# Patient Record
Sex: MALE | Race: WHITE
[De-identification: names, ages, dates, MRNs, and addresses within clinical notes are randomized per-mention and may not be internally consistent; named-entity substitution may affect disease eponyms.]

---

## 2017-01-03 NOTE — XMS REPORT
Continuity of Care Document

 Created on: 2014



HUNG REINOSO

External Reference #: [id number]

: 1955

Sex: Male



Demographics







 Address  [address] [place]  [address]

 

 Home Phone  [phone]

 

 Preferred Language  English

 

 Marital Status  Unknown

 

 Hinduism Affiliation  Unknown

 

 Race  Unknown

 

 Ethnic Group  Unknown





Author







 Author  [facility]

 

 Organization  [facility]

 

 Address  Unknown

 

 Phone  Unavailable







Support







 Name  Relationship  Address  Phone

 

 FREDRICK HEARN DO  Caregiver  [address] [place]

[place]  [address] [phone]

 

 ELANA YI MD  Caregiver  [facility]

 [address] [place]  [address] [phone]

 

 STACY REINOSO  Next Of Kin  [address] [place]  [address] [phone]







Insurance Providers







 Payer Name  Policy Number  Subscriber Name  Relationship

 

 [facility]  [id number]  Hung Reinoso  17 Self / Same As Patient







Advance Directives







 Directive  Response  Recorded Date/Time

 

 Advance Directives  Yes  14 7:25am

 

 Health Care Power of   No  14 7:25am

 

 Organ Donor  No  14 7:25am







Problems

No known problems or medical conditions.



Medications







 Medication  Dose  Route  Sig  Days/Qty  Instructions  Order Date  Discontinued 
Date  Status

 

 Lisinopril  10 Mg  PO  DAILY        12  Discontinued

 

 Sawpalmtofrtxt/Zinc Picolinate  1 Tab  PO  TWICE A DAY        12/04/12  05/10/
13  Discontinued

 

 Omega-3/Dha/Epa/Fish Oil  3,000 Mg  PO  THREE TIMES A DAY        12/04/12  05/
10/13  Discontinued

 

 Acetaminophen  1,000 Mg  PO  EVERY 6 HOURS PRN     PRN PAIN  01/15/13  05/10/
13  Discontinued

 

 Acetaminophen/Hydrocodone Bitart  5-500 Tab  PO  Q4HR PRN        13     
Active

 

 Fluticasone Propionate  2 Sprays  NSEACH  BEDTIME PRN     PRN ALLERGY SYMPTOMS
  13     Active

 

 Ondansetron Hcl  8 Mg  PO  EVERY 8HRS PRN        04/23/13  05/10/13  
Discontinued

 

 Sildenafil Citrate  100 Mg  PO  DAILY PRN     PRN IMPOTENCE  04/23/13  05/10/
13  Discontinued

 

 Acetaminophen/Hydrocodone Bitart  1 - 2 Each  PO  Q 4 - 6 HR PRN  30 Qty     05
/10/13  06/04/13  Discontinued

 

 Metoprolol Succinate  12.5 Mg  PO  BEDTIME        05/10/13  06/12/13  
Discontinued

 

 Pantoprazole Sod  40 Mg  PO  DAILY        05/10/13     Active

 

 Ondansetron Hcl  8 Mg  PO  EVERY 8HRS PRN     PRN NAUSEA AND VOMITING       Active

 

 Levofloxacin  500 Mg  PO  DAILY@11  7 Days     13  Discontinued

 

 Bethanechol Chl  50 Mg  PO  BEFORE MEALS AND AT BEDTIME  30 Days     13 
    Active

 

 Magnesium Oxide  250 Mg  PO           14     Active

 

 Tamsulosin Hcl  0.4 Mg  PO  TWICE A DAY        14     Active







Social History







 Social History Problem  Response  Recorded Date/Time

 

 Alcohol Use  Denies Use  2013 4:55pm

 

 Recreational Drug Use  No  2013 4:55pm

 

 Recent Foreign Travel  No  2013 4:55pm

 

 Recent Infectious Disease Exposure  No  2013 4:55pm

 

 Hospitalization with Isolation  Denies  2013 4:20pm

 

 Sexually Transmitted Disease  No  2013 4:55pm

 

 HIV/AIDS  No  2013 4:55pm







Hospital Discharge Instructions

No hospital discharge instructions.



Plan of Care

No plan of care.



Functional Status

No functional status results.



Allergies, Adverse Reactions, Alerts







 Allergen  Type  Severity  Reaction  Status  Last Updated

 

 Penicillins ([id number])  Allergy        Active  13







Immunizations







 Name  Given  Type

 

 Date of Influenza Vaccine  10/23/12  Historical

 

 Tetanus Booster (TDap)  More than 5yrs  Historical







Vital Signs

No known vital signs results.



Results

Laboratory Results







 Test Name  Result  Units  Flags  Reference  Collection Date/Time  Result Date/
Time  Comments

 

 White Blood Count  7.9  10^3/uL     4.3-11.0  2014 10:45am  2014 11
:06am   

 

 Red Blood Count  4.93  10^6/uL     4.35-5.85  2014 10:45am  2014 11
:06am   

 

 Hemoglobin  15.3  G/DL     13.3-17.7  2014 10:45am  2014 11:06am   

 

 Hematocrit  44  %     40-54  2014 10:45am  2014 11:06am   

 

 Mean Corpuscular Volume  90  FL     80-99  2014 10:45am  2014 11:
06am   

 

 Mean Corpuscular Hemoglobin  31  PG     25-34  2014 10:45am  2014 
11:06am   

 

 Mean Corpuscular Hemoglobin Concent  35  G/DL     32-36  2014 10:45am   11:06am   

 

 Red Cell Distribution Width  15.0  %  H  10.0-14.5  2014 10:45am  2014 11:06am   

 

 Platelet Count  270  10^3/uL     130-400  2014 10:45am  2014 11:
06am   

 

 Mean Platelet Volume  12.0  FL  H  7.4-10.4  2014 10:45am  2014 11:
06am   

 

 Neutrophils (%) (Auto)  68  %     42-75  2014 10:45am  2014 11:
06am   

 

 Lymphocytes (%) (Auto)  20  %     12-44  2014 10:45am  2014 11:
06am   

 

 Monocytes (%) (Auto)  11  %     0-12  2014 10:45am  2014 11:06am   

 

 Eosinophils (%) (Auto)  1  %     0-10  2014 10:45am  2014 11:06am 
  

 

 Basophils (%) (Auto)  0  %     0-10  2014 10:45am  2014 11:06am   

 

 Neutrophils # (Auto)  5.4  X 10^3     1.8-7.8  2014 10:45am  2014 
11:06am   

 

 Lymphocytes # (Auto)  1.6  X 10^3     1.0-4.0  2014 10:45am  2014 
11:06am   

 

 Monocytes # (Auto)  0.9  X 10^3     0.0-1.0  2014 10:45am  2014 11:
06am   

 

 Eosinophils # (Auto)  0.1  10^3/uL     0.0-0.3  2014 10:45am  2014 
11:06am   

 

 Basophils # (Auto)  0.0  10^3/uL     0.0-0.1  2014 10:45am  2014 11
:06am   

 

 Sodium Level  138  MMOL/L     135-145  2014 10:45am  2014 11:18am 
  

 

 Potassium Level  4.2  MMOL/L     3.6-5.0  2014 10:45am  2014 11:
18am   

 

 Chloride Level  108  MMOL/L  H    2014 10:45am  2014 11:18am
   

 

 Carbon Dioxide Level  20  MMOL/L  L  21-32  2014 10:45am  2014 11:
18am   

 

 Blood Urea Nitrogen  11  MG/DL     7-18  2014 10:45am  2014 11:
18am   

 

 Creatinine  1.07  MG/DL     0.60-1.30  2014 10:45am  2014 11:18am 
  

 

 BUN/Creatinine Ratio  10           2014 10:45am  2014 11:18am   

 

 Estimat Glomerular Filtration Rate  > 60           2014 10:45am  2014 11:18am                    GFR INTERPRETIVE DATA



         UNITS FOR ESTIMATED GFR (eGFR): mL/min/1.73 M2

         REFERENCE RANGE FOR ESTIMATED GFR (eGFR)

                                          eGFR

         NORMAL eGFR                       >60

         MODERATELY DECREASED eGFR          30-59

         SEVERLY DECREASED eGFR             15-29

         KIDNEY FAILURE                    <15 (OR DIALYSIS)

 

 Glucose Level  85  MG/DL       2014 10:45am  2014 11:18am   

 

 Calcium Level  9.1  MG/DL     8.5-10.1  2014 10:45am  2014 11:18am
   

 

 Magnesium Level  2.1  MG/DL     1.8-2.4  2014 10:45am  2014 11:
18am   

 

 Total Bilirubin  0.8  MG/DL     0.1-1.0  2014 10:45am  2014 11:
18am   

 

 Alkaline Phosphatase  91  U/L       2014 10:45am  2014 11:
18am   

 

 Aspartate Amino Transf (AST/SGOT)  15  U/L     5-34  2014 10:45am  2014 11:18am   

 

 Alanine Aminotransferase (ALT/SGPT)  16  U/L     0-55  2014 10:45am   11:18am   

 

 Total Protein  6.6  G/DL     6.4-8.2  2014 10:45am  2014 11:18am   

 

 Albumin  3.7  G/DL     3.2-4.5  2014 10:45am  2014 11:18am   







Procedures

No known history of procedures.



Encounters







 Encounter  Location  Date/Time

 

 Discharged Recurring  [facility]  14 10:18am

## 2017-03-27 ENCOUNTER — HOSPITAL ENCOUNTER (OUTPATIENT)
Dept: HOSPITAL 75 - ONC | Age: 62
LOS: 7 days | Discharge: HOME | End: 2017-04-03
Attending: INTERNAL MEDICINE
Payer: MEDICARE

## 2017-03-27 DIAGNOSIS — C20: Primary | ICD-10-CM

## 2017-03-27 DIAGNOSIS — N18.9: ICD-10-CM

## 2017-03-27 DIAGNOSIS — Z45.2: ICD-10-CM

## 2017-03-27 DIAGNOSIS — I12.9: ICD-10-CM

## 2017-03-27 DIAGNOSIS — Z79.899: ICD-10-CM

## 2017-03-27 PROCEDURE — 96523 IRRIG DRUG DELIVERY DEVICE: CPT

## 2017-05-02 LAB
ALBUMIN SERPL-MCNC: 3.9 G/DL (ref 3.2–4.5)
ALT SERPL-CCNC: 22 U/L (ref 0–55)
ANION GAP SERPL CALC-SCNC: 8 MMOL/L (ref 5–14)
AST SERPL-CCNC: 19 U/L (ref 5–34)
BASOPHILS # BLD AUTO: 0 10^3/UL (ref 0–0.1)
BASOPHILS NFR BLD AUTO: 0 % (ref 0–10)
BILIRUB SERPL-MCNC: 0.9 MG/DL (ref 0.1–1)
BUN SERPL-MCNC: 15 MG/DL (ref 7–18)
BUN/CREAT SERPL: 13
CALCIUM SERPL-MCNC: 9.1 MG/DL (ref 8.5–10.1)
CHLORIDE SERPL-SCNC: 106 MMOL/L (ref 98–107)
CO2 SERPL-SCNC: 23 MMOL/L (ref 21–32)
CREAT SERPL-MCNC: 1.13 MG/DL (ref 0.6–1.3)
EOSINOPHIL # BLD AUTO: 0.1 10^3/UL (ref 0–0.3)
EOSINOPHIL NFR BLD AUTO: 1 % (ref 0–10)
ERYTHROCYTE [DISTWIDTH] IN BLOOD BY AUTOMATED COUNT: 15.1 % (ref 10–14.5)
GFR SERPLBLD BASED ON 1.73 SQ M-ARVRAT: > 60 ML/MIN
GLUCOSE SERPL-MCNC: 187 MG/DL (ref 70–105)
LYMPHOCYTES # BLD AUTO: 1.3 X 10^3 (ref 1–4)
LYMPHOCYTES NFR BLD AUTO: 15 % (ref 12–44)
MCH RBC QN AUTO: 32 PG (ref 25–34)
MCHC RBC AUTO-ENTMCNC: 34 G/DL (ref 32–36)
MCV RBC AUTO: 93 FL (ref 80–99)
MONOCYTES # BLD AUTO: 0.7 X 10^3 (ref 0–1)
MONOCYTES NFR BLD AUTO: 8 % (ref 0–12)
NEUTROPHILS # BLD AUTO: 6.5 X 10^3 (ref 1.8–7.8)
NEUTROPHILS NFR BLD AUTO: 77 % (ref 42–75)
PLATELET # BLD: 216 10^3/UL (ref 130–400)
PMV BLD AUTO: 12.3 FL (ref 7.4–10.4)
POTASSIUM SERPL-SCNC: 4 MMOL/L (ref 3.6–5)
PROT SERPL-MCNC: 6.8 G/DL (ref 6.4–8.2)
RBC # BLD AUTO: 4.93 10^6/UL (ref 4.35–5.85)
SODIUM SERPL-SCNC: 137 MMOL/L (ref 135–145)
WBC # BLD AUTO: 8.5 10^3/UL (ref 4.3–11)

## 2017-06-29 ENCOUNTER — HOSPITAL ENCOUNTER (OUTPATIENT)
Dept: HOSPITAL 75 - RAD | Age: 62
End: 2017-06-29
Attending: FAMILY MEDICINE
Payer: MEDICARE

## 2017-06-29 DIAGNOSIS — E78.5: ICD-10-CM

## 2017-06-29 DIAGNOSIS — R73.9: ICD-10-CM

## 2017-06-29 DIAGNOSIS — R29.890: ICD-10-CM

## 2017-06-29 DIAGNOSIS — M85.89: Primary | ICD-10-CM

## 2017-06-29 LAB
ANION GAP SERPL CALC-SCNC: 12 MMOL/L (ref 5–14)
BUN SERPL-MCNC: 14 MG/DL (ref 7–18)
BUN/CREAT SERPL: 14
CALCIUM SERPL-MCNC: 9.2 MG/DL (ref 8.5–10.1)
CHLORIDE SERPL-SCNC: 107 MMOL/L (ref 98–107)
CHOLEST SERPL-MCNC: 165 MG/DL (ref ?–200)
CO2 SERPL-SCNC: 18 MMOL/L (ref 21–32)
CREAT SERPL-MCNC: 1.02 MG/DL (ref 0.6–1.3)
GFR SERPLBLD BASED ON 1.73 SQ M-ARVRAT: > 60 ML/MIN
GLUCOSE SERPL-MCNC: 94 MG/DL (ref 70–105)
LDLC SERPL DIRECT ASSAY-MCNC: 43 MG/DL (ref 1–129)
POTASSIUM SERPL-SCNC: 4.4 MMOL/L (ref 3.6–5)
SODIUM SERPL-SCNC: 137 MMOL/L (ref 135–145)
TRIGL SERPL-MCNC: 456 MG/DL (ref ?–150)
VLDLC SERPL CALC-MCNC: 91 MG/DL (ref 5–40)

## 2017-06-29 PROCEDURE — 80061 LIPID PANEL: CPT

## 2017-06-29 PROCEDURE — 80048 BASIC METABOLIC PNL TOTAL CA: CPT

## 2017-06-29 PROCEDURE — 77080 DXA BONE DENSITY AXIAL: CPT

## 2017-06-29 PROCEDURE — 83036 HEMOGLOBIN GLYCOSYLATED A1C: CPT

## 2017-06-29 PROCEDURE — 36415 COLL VENOUS BLD VENIPUNCTURE: CPT

## 2017-06-29 NOTE — DIAGNOSTIC IMAGING REPORT
Examination: DEXA scan.



Indication: Osteopenia.



Technique: Bone mineral density estimated based on dual energy

radiography over the lumbar spine and femoral necks, was

performed.



Findings: The lumbar spine T-score is -2.1.



T-score in the left femoral neck is -1.4 and on the right side is

-0.9.



Impression: Osteopenia.

 



Dictated by: 



  Dictated on workstation # [id number]

## 2017-07-25 ENCOUNTER — HOSPITAL ENCOUNTER (OUTPATIENT)
Dept: HOSPITAL 75 - ONC | Age: 62
LOS: 6 days | Discharge: HOME | End: 2017-07-31
Attending: INTERNAL MEDICINE
Payer: MEDICARE

## 2017-07-25 DIAGNOSIS — Z45.2: ICD-10-CM

## 2017-07-25 DIAGNOSIS — C20: Primary | ICD-10-CM

## 2017-07-25 DIAGNOSIS — I12.9: ICD-10-CM

## 2017-07-25 DIAGNOSIS — Z79.899: ICD-10-CM

## 2017-07-25 DIAGNOSIS — N18.9: ICD-10-CM

## 2017-07-25 PROCEDURE — 36591 DRAW BLOOD OFF VENOUS DEVICE: CPT

## 2017-07-25 PROCEDURE — 96372 THER/PROPH/DIAG INJ SC/IM: CPT

## 2017-07-25 PROCEDURE — 99213 OFFICE O/P EST LOW 20 MIN: CPT

## 2017-07-25 PROCEDURE — 80053 COMPREHEN METABOLIC PANEL: CPT

## 2017-07-25 PROCEDURE — 85025 COMPLETE CBC W/AUTO DIFF WBC: CPT

## 2017-07-25 PROCEDURE — 96523 IRRIG DRUG DELIVERY DEVICE: CPT

## 2017-07-25 PROCEDURE — 82378 CARCINOEMBRYONIC ANTIGEN: CPT

## 2017-09-12 ENCOUNTER — HOSPITAL ENCOUNTER (OUTPATIENT)
Dept: HOSPITAL 75 - ONC | Age: 62
LOS: 18 days | Discharge: HOME | End: 2017-09-30
Attending: INTERNAL MEDICINE
Payer: MEDICARE

## 2017-09-12 DIAGNOSIS — N18.9: ICD-10-CM

## 2017-09-12 DIAGNOSIS — C20: Primary | ICD-10-CM

## 2017-09-12 DIAGNOSIS — I12.9: ICD-10-CM

## 2017-09-12 DIAGNOSIS — Z45.2: ICD-10-CM

## 2017-09-12 DIAGNOSIS — Z79.899: ICD-10-CM

## 2017-09-12 PROCEDURE — 96523 IRRIG DRUG DELIVERY DEVICE: CPT

## 2017-10-17 ENCOUNTER — HOSPITAL ENCOUNTER (OUTPATIENT)
Dept: HOSPITAL 75 - ONC | Age: 62
LOS: 36 days | Discharge: HOME | End: 2017-11-22
Attending: INTERNAL MEDICINE
Payer: MEDICARE

## 2017-10-17 DIAGNOSIS — N18.9: ICD-10-CM

## 2017-10-17 DIAGNOSIS — C20: Primary | ICD-10-CM

## 2017-10-17 DIAGNOSIS — Z79.899: ICD-10-CM

## 2017-10-17 DIAGNOSIS — Z45.2: ICD-10-CM

## 2017-10-17 DIAGNOSIS — I12.9: ICD-10-CM

## 2017-10-17 PROCEDURE — 96523 IRRIG DRUG DELIVERY DEVICE: CPT

## 2017-11-22 ENCOUNTER — HOSPITAL ENCOUNTER (OUTPATIENT)
Dept: HOSPITAL 75 - LAB | Age: 62
End: 2017-11-22
Attending: INTERNAL MEDICINE
Payer: MEDICARE

## 2017-11-22 DIAGNOSIS — E78.5: Primary | ICD-10-CM

## 2017-11-22 LAB
CHOLEST SERPL-MCNC: 146 MG/DL (ref ?–200)
LDLC SERPL DIRECT ASSAY-MCNC: 60 MG/DL (ref 1–129)
TRIGL SERPL-MCNC: 256 MG/DL (ref ?–150)
VLDLC SERPL CALC-MCNC: 51 MG/DL (ref 5–40)

## 2017-11-22 PROCEDURE — 80061 LIPID PANEL: CPT

## 2017-11-22 PROCEDURE — 36415 COLL VENOUS BLD VENIPUNCTURE: CPT

## 2018-01-04 ENCOUNTER — HOSPITAL ENCOUNTER (OUTPATIENT)
Dept: HOSPITAL 75 - LAB | Age: 63
End: 2018-01-04
Attending: UROLOGY
Payer: MEDICARE

## 2018-01-04 DIAGNOSIS — Z12.5: Primary | ICD-10-CM

## 2018-01-04 PROCEDURE — 36415 COLL VENOUS BLD VENIPUNCTURE: CPT

## 2018-01-04 PROCEDURE — 84153 ASSAY OF PSA TOTAL: CPT

## 2018-01-25 ENCOUNTER — HOSPITAL ENCOUNTER (OUTPATIENT)
Dept: HOSPITAL 75 - SDC | Age: 63
End: 2018-01-25
Attending: FAMILY MEDICINE
Payer: MEDICARE

## 2018-01-25 DIAGNOSIS — M81.0: Primary | ICD-10-CM

## 2018-02-20 ENCOUNTER — HOSPITAL ENCOUNTER (OUTPATIENT)
Dept: HOSPITAL 75 - ONC | Age: 63
Discharge: HOME | End: 2018-02-20
Attending: INTERNAL MEDICINE
Payer: MEDICARE

## 2018-02-20 DIAGNOSIS — C20: Primary | ICD-10-CM

## 2018-02-20 DIAGNOSIS — Z45.2: ICD-10-CM

## 2018-02-20 DIAGNOSIS — I12.9: ICD-10-CM

## 2018-02-20 DIAGNOSIS — Z79.899: ICD-10-CM

## 2018-02-20 DIAGNOSIS — N18.9: ICD-10-CM

## 2018-02-20 PROCEDURE — 96523 IRRIG DRUG DELIVERY DEVICE: CPT

## 2018-02-20 PROCEDURE — 36591 DRAW BLOOD OFF VENOUS DEVICE: CPT

## 2018-06-05 ENCOUNTER — HOSPITAL ENCOUNTER (OUTPATIENT)
Dept: HOSPITAL 75 - ONC | Age: 63
LOS: 35 days | Discharge: HOME | End: 2018-07-10
Attending: INTERNAL MEDICINE
Payer: MEDICARE

## 2018-06-05 DIAGNOSIS — N18.9: ICD-10-CM

## 2018-06-05 DIAGNOSIS — C20: Primary | ICD-10-CM

## 2018-06-05 DIAGNOSIS — Z79.899: ICD-10-CM

## 2018-06-05 DIAGNOSIS — I12.9: ICD-10-CM

## 2018-06-05 LAB
ALBUMIN SERPL-MCNC: 3.7 GM/DL (ref 3.2–4.5)
ALP SERPL-CCNC: 74 U/L (ref 40–136)
ALT SERPL-CCNC: 16 U/L (ref 0–55)
BASOPHILS # BLD AUTO: 0 10^3/UL (ref 0–0.1)
BASOPHILS NFR BLD AUTO: 1 % (ref 0–10)
BILIRUB SERPL-MCNC: 0.8 MG/DL (ref 0.1–1)
BUN/CREAT SERPL: 18
CALCIUM SERPL-MCNC: 8.9 MG/DL (ref 8.5–10.1)
CHLORIDE SERPL-SCNC: 109 MMOL/L (ref 98–107)
CO2 SERPL-SCNC: 18 MMOL/L (ref 21–32)
CREAT SERPL-MCNC: 0.98 MG/DL (ref 0.6–1.3)
EOSINOPHIL # BLD AUTO: 0.2 10^3/UL (ref 0–0.3)
EOSINOPHIL NFR BLD AUTO: 2 % (ref 0–10)
ERYTHROCYTE [DISTWIDTH] IN BLOOD BY AUTOMATED COUNT: 14.3 % (ref 10–14.5)
GFR SERPLBLD BASED ON 1.73 SQ M-ARVRAT: > 60 ML/MIN
GLUCOSE SERPL-MCNC: 121 MG/DL (ref 70–105)
HCT VFR BLD CALC: 46 % (ref 40–54)
HGB BLD-MCNC: 16.1 G/DL (ref 13.3–17.7)
LYMPHOCYTES # BLD AUTO: 1.5 X 10^3 (ref 1–4)
LYMPHOCYTES NFR BLD AUTO: 17 % (ref 12–44)
MANUAL DIFFERENTIAL PERFORMED BLD QL: NO
MCH RBC QN AUTO: 32 PG (ref 25–34)
MCHC RBC AUTO-ENTMCNC: 35 G/DL (ref 32–36)
MCV RBC AUTO: 93 FL (ref 80–99)
MONOCYTES # BLD AUTO: 1 X 10^3 (ref 0–1)
MONOCYTES NFR BLD AUTO: 11 % (ref 0–12)
NEUTROPHILS # BLD AUTO: 6.2 X 10^3 (ref 1.8–7.8)
NEUTROPHILS NFR BLD AUTO: 70 % (ref 42–75)
PLATELET # BLD: 196 10^3/UL (ref 130–400)
PMV BLD AUTO: 13.2 FL (ref 7.4–10.4)
POTASSIUM SERPL-SCNC: 4.5 MMOL/L (ref 3.6–5)
PROT SERPL-MCNC: 6.5 GM/DL (ref 6.4–8.2)
RBC # BLD AUTO: 4.98 10^6/UL (ref 4.35–5.85)
SODIUM SERPL-SCNC: 137 MMOL/L (ref 135–145)
WBC # BLD AUTO: 8.8 10^3/UL (ref 4.3–11)

## 2018-06-05 PROCEDURE — 80053 COMPREHEN METABOLIC PANEL: CPT

## 2018-06-05 PROCEDURE — 36591 DRAW BLOOD OFF VENOUS DEVICE: CPT

## 2018-06-05 PROCEDURE — 82378 CARCINOEMBRYONIC ANTIGEN: CPT

## 2018-06-05 PROCEDURE — 85025 COMPLETE CBC W/AUTO DIFF WBC: CPT

## 2018-07-10 ENCOUNTER — HOSPITAL ENCOUNTER (OUTPATIENT)
Dept: HOSPITAL 75 - ONC | Age: 63
LOS: 21 days | Discharge: HOME | End: 2018-07-31
Attending: INTERNAL MEDICINE
Payer: MEDICARE

## 2018-07-10 DIAGNOSIS — M85.80: ICD-10-CM

## 2018-07-10 DIAGNOSIS — Z79.82: ICD-10-CM

## 2018-07-10 DIAGNOSIS — I12.9: ICD-10-CM

## 2018-07-10 DIAGNOSIS — N18.9: ICD-10-CM

## 2018-07-10 DIAGNOSIS — C20: Primary | ICD-10-CM

## 2018-07-10 DIAGNOSIS — Z79.899: ICD-10-CM

## 2018-07-10 LAB
ALBUMIN SERPL-MCNC: 4 GM/DL (ref 3.2–4.5)
ALP SERPL-CCNC: 82 U/L (ref 40–136)
ALT SERPL-CCNC: 21 U/L (ref 0–55)
BASOPHILS # BLD AUTO: 0 10^3/UL (ref 0–0.1)
BASOPHILS NFR BLD AUTO: 0 % (ref 0–10)
BILIRUB SERPL-MCNC: 0.7 MG/DL (ref 0.1–1)
BUN/CREAT SERPL: 15
CALCIUM SERPL-MCNC: 9.8 MG/DL (ref 8.5–10.1)
CHLORIDE SERPL-SCNC: 110 MMOL/L (ref 98–107)
CO2 SERPL-SCNC: 19 MMOL/L (ref 21–32)
CREAT SERPL-MCNC: 1.4 MG/DL (ref 0.6–1.3)
EOSINOPHIL # BLD AUTO: 0.1 10^3/UL (ref 0–0.3)
EOSINOPHIL NFR BLD AUTO: 1 % (ref 0–10)
ERYTHROCYTE [DISTWIDTH] IN BLOOD BY AUTOMATED COUNT: 14.4 % (ref 10–14.5)
GFR SERPLBLD BASED ON 1.73 SQ M-ARVRAT: 51 ML/MIN
GLUCOSE SERPL-MCNC: 111 MG/DL (ref 70–105)
HCT VFR BLD CALC: 48 % (ref 40–54)
HGB BLD-MCNC: 17.1 G/DL (ref 13.3–17.7)
LYMPHOCYTES # BLD AUTO: 1.9 X 10^3 (ref 1–4)
LYMPHOCYTES NFR BLD AUTO: 18 % (ref 12–44)
MANUAL DIFFERENTIAL PERFORMED BLD QL: NO
MCH RBC QN AUTO: 33 PG (ref 25–34)
MCHC RBC AUTO-ENTMCNC: 36 G/DL (ref 32–36)
MCV RBC AUTO: 93 FL (ref 80–99)
MONOCYTES # BLD AUTO: 1.5 X 10^3 (ref 0–1)
MONOCYTES NFR BLD AUTO: 15 % (ref 0–12)
NEUTROPHILS # BLD AUTO: 6.6 X 10^3 (ref 1.8–7.8)
NEUTROPHILS NFR BLD AUTO: 65 % (ref 42–75)
PLATELET # BLD: 206 10^3/UL (ref 130–400)
PMV BLD AUTO: 12.7 FL (ref 7.4–10.4)
POTASSIUM SERPL-SCNC: 4.9 MMOL/L (ref 3.6–5)
PROT SERPL-MCNC: 6.9 GM/DL (ref 6.4–8.2)
RBC # BLD AUTO: 5.15 10^6/UL (ref 4.35–5.85)
SODIUM SERPL-SCNC: 139 MMOL/L (ref 135–145)
WBC # BLD AUTO: 10 10^3/UL (ref 4.3–11)

## 2018-07-10 PROCEDURE — 36591 DRAW BLOOD OFF VENOUS DEVICE: CPT

## 2018-07-20 ENCOUNTER — HOSPITAL ENCOUNTER (OUTPATIENT)
Dept: HOSPITAL 75 - PREOP | Age: 63
End: 2018-07-20
Attending: SURGERY
Payer: MEDICARE

## 2018-07-20 VITALS — HEIGHT: 67 IN | WEIGHT: 173.13 LBS | BODY MASS INDEX: 27.17 KG/M2

## 2018-07-20 DIAGNOSIS — Z01.818: Primary | ICD-10-CM

## 2018-07-20 DIAGNOSIS — C20: ICD-10-CM

## 2018-08-17 ENCOUNTER — HOSPITAL ENCOUNTER (OUTPATIENT)
Dept: HOSPITAL 75 - SDC | Age: 63
Discharge: HOME | End: 2018-08-17
Attending: SURGERY
Payer: MEDICARE

## 2018-08-17 VITALS — BODY MASS INDEX: 32.21 KG/M2 | HEIGHT: 67 IN | WEIGHT: 205.25 LBS

## 2018-08-17 VITALS — SYSTOLIC BLOOD PRESSURE: 140 MMHG | DIASTOLIC BLOOD PRESSURE: 80 MMHG

## 2018-08-17 VITALS — DIASTOLIC BLOOD PRESSURE: 74 MMHG | SYSTOLIC BLOOD PRESSURE: 116 MMHG

## 2018-08-17 VITALS — DIASTOLIC BLOOD PRESSURE: 68 MMHG | SYSTOLIC BLOOD PRESSURE: 116 MMHG

## 2018-08-17 VITALS — SYSTOLIC BLOOD PRESSURE: 116 MMHG | DIASTOLIC BLOOD PRESSURE: 68 MMHG

## 2018-08-17 DIAGNOSIS — Z85.048: Primary | ICD-10-CM

## 2018-08-17 DIAGNOSIS — Z92.21: ICD-10-CM

## 2018-08-17 DIAGNOSIS — Z87.891: ICD-10-CM

## 2018-08-17 DIAGNOSIS — Z92.3: ICD-10-CM

## 2018-08-17 DIAGNOSIS — I10: ICD-10-CM

## 2018-08-17 DIAGNOSIS — Z86.73: ICD-10-CM

## 2018-08-17 DIAGNOSIS — Z11.2: ICD-10-CM

## 2018-08-17 DIAGNOSIS — M19.91: ICD-10-CM

## 2018-08-17 PROCEDURE — 87081 CULTURE SCREEN ONLY: CPT

## 2018-08-17 NOTE — OPERATIVE REPORT
Operative Report


Date of Procedure/Surgery


Aug 17, 2018


Surgeon (s)


ELIZA BROWN MD


Assistant (s):  N/A





Post-Operative Diagnosis





Same





Procedure Performed





Removal of Nphtar-x-Jofr





Description of Procedure


Anesthesia Type:  MAC


Estimated blood loss (mL):  Minimal


Specimen(s) collected/removed


None


Description of the Procedure


Indication for the procedure: This gentleman completed postoperative adjuvant 

therapy, following resection of rectal carcinoma, several years ago.  After an 

adequate discussion with his oncologist, it was felt reasonable to remove his 

Dugvmj-d-Bmbr.  Informed consent was obtained after reviewing the procedure in 

detail.





Description of procedure: He was placed supine on the operating table and our 

CRNA administered sedation, monitoring his vital signs.  Intravenous 

antibiotics were administered as prophylaxis against wound infection.  Right 

infraclavicular fossa was prepared and draped in the usual sterile manner.  

Local anesthesia was achieved using 0.5 percent Marcaine with epinephrine.  A 

secondary incision was made along the previous scar and Tlhixy-o-Yoiw removed 

without risking air embolism.  The incision was then closed using 3-0 Vicryl 

for the dermal layer and 4-0 Vicryl for skin, in a subcuticular fashion.





Steri-Strips and a sterile dressing were applied.





He tolerated the procedure well and was taken to the recovery room in a stable 

condition.


Findings of the Procedure


See op report





Allergies and Home Medications


Allergies


Coded Allergies:  


     Penicillins (Unverified  Allergy, Unknown, 7/20/18)





Home Medications


Aspirin 325 Mg Tablet, 325 MG PO DAILY, (Reported)


Hydrocodone Bit/Acetaminophen 1 Tab Tab, 1-2 TAB PO 4-6HR PRN for PAIN


   Prescribed by: ELIZA BROWN on 8/17/18 1042


Hydrocodone/Acetaminophen 1 Each Tablet, 1 EACH PO Q4-6HR PRN for PAIN, (

Reported)


Multivitamin 1 Each Tablet, 1 EACH PO DAILY, (Reported)


Omega 3 Polyunsat Fatty Acids 1,000 Mg Cap, 1,000 MG PO TID, (Reported)





Patient Home Medication List


Home Medication List Reviewed:  Yes











ELIZA BROWN MD Aug 17, 2018 11:25 am

## 2018-08-17 NOTE — XMS REPORT
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                        
          

 

 10/27/2015            RAFY RAMAN ARNP            Ot            154.1    
                              

 

 10/27/2015            RAFY RAMAN ARNP            Ot            403.90   
                               

 

 10/27/2015            RAFY RAMAN ARNP            Ot            585.9    
                              

 

 10/27/2015            RAFY RAMAN ARNP            Ot            716.90   
                               

 

 10/27/2015            RAFY RAMAN ARNP            Ot            746.4    
                              

 

 10/27/2015            RAFY RAMAN ARNP            Ot            V58.69   
                               

 

 10/27/2015            ORENDER DO, NAT S            Ot            285.9 
                                 

 

 10/27/2015            ORENDER DO, NAT S            Ot            586   
                               

 

 10/27/2015            SCHOENFELD DO, ROGER H            Ot            788.20  
                                

 

 10/27/2015            STEPHANIE HERNANDEZ, ELIZA REED            Ot            V72.84    
                              

 

 10/27/2015            ORENDER DO, NAT S            Ot            272.4 
                                 

 

 10/27/2015            ORENDER DO, NAT S            Ot            285.9 
                                 

 

 10/27/2015            ORENDER DO, NAT S            Ot            780.79
                                  

 

 10/27/2015            KD HERNANDEZ, ELANA DUARTE            Ot            154.1          
                        

 

 10/27/2015            BROWN DO, GREGG            Ot            266.2         
                         

 

 10/27/2015            BROWN DO, GREGG            Ot            272.4         
                         

 

 10/27/2015            BROWN DO, GREGG            Ot            780.79        
                          

 

 10/27/2015            KD HERNANDEZ, ELANA DUARTE            Ot            154.1          
                        

 

 10/27/2015            KD HERNANDEZ, ELANA DUARTE            Ot            403.90         
                         

 

 10/27/2015            KD HERNANDEZ, ELANA DUARTE            Ot            585.9          
                        

 

 10/27/2015            KD HERNANDEZ, ELANA DUARTE            Ot            716.90         
                         

 

 10/27/2015            KD HERNANDEZ, ELANA DUARTE            Ot            V58.69         
                         

 

 10/27/2015            KD HERNANDEZ, ELANA DUARTE            Ot            V58.81         
                         

 

 10/29/2015            KD HERNANDEZ, ELANA DUARTE            Ot            C20            
                      

 

 2015            KD HERNANDEZ, ELANA DUARTE            Ot            154.1          
                        

 

 2015            KD HERNANDEZ, ELANA DUARTE            Ot            403.90         
                         

 

 2015            KD HERNANDEZ, ELANA DUARTE            Ot            585.9          
                        

 

 2015            KD HERNANDEZ, ELANA DUARTE            Ot            716.90         
                         

 

 2015            KD HERNANDEZ, ELANA DUARTE            Ot            V58.69         
                         

 

 2015            KD HERNANDEZ, ELANA DUARTE            Ot            V58.81         
                         

 

 2015            KD HERNANDEZ, ELANA DUARTE            Ot            C20            
                      

 

 2015            KD HERNANDEZ, ELANA DUARTE            Ot            154.1          
                        

 

 2015            KD HERNANDEZ, ELANA DUARTE            Ot            403.90         
                         

 

 2015            KD HERNANDEZ, ELANA DUARTE            Ot            585.9          
                        

 

 2015            KD HERNANDEZ, ELANA DUARTE            Ot            716.90         
                         

 

 2015            KD HERNANDEZ, ELANA DUARTE            Ot            V58.69         
                         

 

 2015            KD HERNANDEZ, ELANA DUARTE            Ot            V58.81         
                         

 

 2015            KD HERNANDEZ, ELANA DUARTE            Ot            C20            
                      

 

 2015            SCHOENFELD DO, ROGER H            Ot            Z12.5   
                               

 

 2015            KD HERNANDEZ, ELANA DUARTE            Ot            C20            
                      

 

 2015            DK HERNANDEZ, ELANA DUARTE            Ot            I12.9          
                        

 

 2015            KD HERNANDEZ, ELANA DUARTE            Ot            N18.9          
                        

 

 2015            KD HERNANDEZ, ELANA DUARTE            Ot            Z79.899        
                          

 

 2015            KD HERNANDEZ, ELANA DUARTE            Ot            C20            
                      

 

 2016            KD HERNANDEZ, ELANA DUARTE            Ot            C20            
                      

 

 2016            KD HERNANDEZ, ELANA DUARTE            Ot            I12.9          
                        

 

 2016            KD HERNANDEZ, ELANA DUARTE            Ot            N18.9          
                        

 

 2016            KD HERNANDEZ, ELANA DUARTE            Ot            Z79.899        
                          

 

 2016            KD HERNANDEZ, ELANA DUARTE            Ot            C20            
                      

 

 2016            KD HERNANDEZ, ELANA DUARTE            Ot            I12.9          
                        

 

 2016            KD HERNANDEZ, ELANA DUARTE            Ot            N18.9          
                        

 

 2016            KD HERNANDEZ, ELANA DUARTE            Ot            Z79.899        
                          

 

 2016            KD HERNANDEZ, ELANA DUARTE            Ot            C20            
MALIGNANT NEOPLASM OF RECTUM                     

 

 2016            KD HERNANDEZ, ELANA DUARTE            Ot            I12.9          
  HYPERTENSIVE CHRONIC KIDNEY DISEASE W ST                     

 

 2016            KD HERNANDEZ, ELANA DUARTE            Ot            N18.9          
  CHRONIC KIDNEY DISEASE, UNSPECIFIED                     

 

 2016            KD HERNANDEZ, ELANA DUARTE            Ot            Z45.2          
  ENCOUNTER FOR ADJUSTMENT AND MANAGEMENT                      

 

 2016            KD HERNANDEZ, ELANA DUARTE            Ot            Z79.899        
    OTHER LONG TERM (CURRENT) DRUG THERAPY                     

 

 2016            NAT INIGUEZ DO            Ot            Z53.9 
                                 

 

 02/10/2016            KD HERNANDEZ, ELANA DUARTE            Ot            C20            
                      

 

 02/10/2016            KD HERNANDEZ, ELANA DUARTE            Ot            I12.9          
                        

 

 02/10/2016            KD HERNANDEZ, ELANA DUARTE            Ot            N18.9          
                        

 

 02/10/2016            KD HERNANDEZ, ELANA DUARTE            Ot            Z79.899        
                          

 

 2016            KD HERNANDEZ, ELANA DUARTE            Ot            C20            
                      

 

 2016            KD HERNANDEZ, ELANA DUARTE            Ot            I12.9          
                        

 

 2016            KD HERNANDEZ, ELANA DUARTE            Ot            N18.9          
                        

 

 2016            KD HERNANDEZ, ELANA DUARTE            Ot            Z45.2          
                        

 

 2016            KD HERNANDEZ, ELANA DUARTE            Ot            Z79.899        
                          

 

 2016            KD HERNANDEZ, ELANA DUARTE            Ot            C20            
MALIGNANT NEOPLASM OF RECTUM                     

 

 2016            KD HERNANDEZ, ELANA DUARTE            Ot            I12.9          
  HYPERTENSIVE CHRONIC KIDNEY DISEASE W ST                     

 

 2016            KD HERNANDEZ, ELANA DUARTE            Ot            N18.9          
  CHRONIC KIDNEY DISEASE, UNSPECIFIED                     

 

 2016            KD HERNANDEZ, ELANA DUARTE            Ot            Z45.2          
  ENCOUNTER FOR ADJUSTMENT AND MANAGEMENT                      

 

 2016            ELANA YI MD, Ot            Z79.899        
    OTHER LONG TERM (CURRENT) DRUG THERAPY                     

 

 2016            ELANA YI MD, Ot            C20            
MALIGNANT NEOPLASM OF RECTUM                     

 

 2016            ELANA YI MD            Ot            I12.9          
  HYPERTENSIVE CHRONIC KIDNEY DISEASE W ST                     

 

 2016            ELANA YI MD, Ot            N18.9          
  CHRONIC KIDNEY DISEASE, UNSPECIFIED                     

 

 2016            ELANA YI MD            Ot            Z45.2          
  ENCOUNTER FOR ADJUSTMENT AND MANAGEMENT                      

 

 2016            ELANA YI MD, Ot            Z79.899        
    OTHER LONG TERM (CURRENT) DRUG THERAPY                     

 

 2016            ORENDER DO, NAT S            Ot            E78.2 
           MIXED HYPERLIPIDEMIA                     

 

 2016            ORENDER DO, NAT S            Ot            R33.9 
           RETENTION OF URINE, UNSPECIFIED                     

 

 2016            ORENDER DO, NAT S            Ot            E78.2 
           MIXED HYPERLIPIDEMIA                     

 

 2016            ORENDER DO, NAT S            Ot            R33.9 
           RETENTION OF URINE, UNSPECIFIED                     

 

 2016            ORENDER DO, NAT S            Ot            E78.2 
           MIXED HYPERLIPIDEMIA                     

 

 2016            ORENDER DO, NAT S            Ot            R33.9 
           RETENTION OF URINE, UNSPECIFIED                     

 

 2016            ORENDER DO, NAT S            Ot            E78.2 
           MIXED HYPERLIPIDEMIA                     

 

 2016            ORENDER DO, NAT S            Ot            R33.9 
           RETENTION OF URINE, UNSPECIFIED                     

 

 2016            ORENDER DO, NAT S            Ot            E78.2 
           MIXED HYPERLIPIDEMIA                     

 

 2016            ORENDER DO, NAT S            Ot            R33.9 
           RETENTION OF URINE, UNSPECIFIED                     

 

 2016            ELANA YI MD            Ot            C20            
MALIGNANT NEOPLASM OF RECTUM                     

 

 2016            ELANA YI MD            Ot            I12.9          
  HYPERTENSIVE CHRONIC KIDNEY DISEASE W ST                     

 

 2016            ELANA YI MD            Ot            N18.9          
  CHRONIC KIDNEY DISEASE, UNSPECIFIED                     

 

 2016            ELANA YI MD            Ot            Z45.2          
  ENCOUNTER FOR ADJUSTMENT AND MANAGEMENT                      

 

 2016            ELANA YI MD, Ot            Z79.899        
    OTHER LONG TERM (CURRENT) DRUG THERAPY                     

 

 2016            ELANA YI MD            Ot            C20            
MALIGNANT NEOPLASM OF RECTUM                     

 

 2016            ELANA YI MD            Ot            I12.9          
  HYPERTENSIVE CHRONIC KIDNEY DISEASE W ST                     

 

 2016            ELANA YI MD            Ot            N18.9          
  CHRONIC KIDNEY DISEASE, UNSPECIFIED                     

 

 2016            ELANA YI MD            Ot            Z45.2          
  ENCOUNTER FOR ADJUSTMENT AND MANAGEMENT                      

 

 2016            ELANA YI MD            Ot            Z79.899        
    OTHER LONG TERM (CURRENT) DRUG THERAPY                     

 

 10/04/2016            ELANA YI MD            Ot            C20            
MALIGNANT NEOPLASM OF RECTUM                     

 

 10/04/2016            ELANA YI MD            Ot            I12.9          
  HYPERTENSIVE CHRONIC KIDNEY DISEASE W ST                     

 

 10/04/2016            ELANA YI MD            Ot            N18.9          
  CHRONIC KIDNEY DISEASE, UNSPECIFIED                     

 

 10/04/2016            ELANA YI MD            Ot            Z45.2          
  ENCOUNTER FOR ADJUSTMENT AND MANAGEMENT                      

 

 10/04/2016            ELANA YI MD            Ot            Z79.899        
    OTHER LONG TERM (CURRENT) DRUG THERAPY                     

 

 10/05/2016            ELANA YI MD            Ot            C20            
MALIGNANT NEOPLASM OF RECTUM                     

 

 10/05/2016            ELANA YI MD            Ot            I12.9          
  HYPERTENSIVE CHRONIC KIDNEY DISEASE W ST                     

 

 10/05/2016            ELANA YI MD            Ot            N18.9          
  CHRONIC KIDNEY DISEASE, UNSPECIFIED                     

 

 10/05/2016            EALNA YI MD            Ot            Z45.2          
  ENCOUNTER FOR ADJUSTMENT AND MANAGEMENT                      

 

 10/05/2016            ELANA YI MD            Ot            Z79.899        
    OTHER LONG TERM (CURRENT) DRUG THERAPY                     

 

 2016                         Ot            401.9            HYPERTENSION 
NOS                     

 

 2016                         Ot            424.1            AORTIC VALVE 
DISORDER                     

 

 2016                         Ot            600.00            HYPERTROPHY 
(BENIGN) OF PROSTATE W/O URI                     

 

 2016                         Ot            780.79            OTH MALAISE 
FATIGUE                     

 

 2016                         Ot            V70.0            ROUTINE 
MEDICAL EXAM                     

 

 2016                         Ot            401.9            HYPERTENSION 
NOS                     

 

 2016                         Ot            V70.0            ROUTINE 
MEDICAL EXAM                     

 

 2016                         Ot            V72.62            LAB EXAM 
ORDERED AS PART OF A ROUTINE GE                     

 

 2016                         Ot            V72.84            EXAM PRE-
OPERATIVE NOS                     

 

 2016                         Ot            600.00            HYPERTROPHY 
(BENIGN) OF PROSTATE W/O URI                     

 

 2016                         Ot            V76.44            SCREEN MAL 
NEOP-PROSTATE                     

 

 2016                         Ot            V72.84            EXAM PRE-
OPERATIVE NOS                     

 

 2016                         Ot            154.1            MALIGNANT 
NEOPL RECTUM                     

 

 2016                         Ot            396.3            MITRAL/
AORTIC ISABEL INSUFF                     

 

 2016                         Ot            397.0            TRICUSPID 
VALVE DISEASE                     

 

 2016                         Ot            429.3            CARDIOMEGALY
                     

 

 2016                         Ot            154.1            MALIGNANT 
NEOPL RECTUM                     

 

 2016                         Ot            V72.81            EXAM-PRE-
OPERATIVE CARDIOVASCULAR                     

 

 2016                         Ot            V74.8            SCREEN-
BACTERIAL DIS NEC                     

 

 2016                         Ot            V72.84            EXAM PRE-
OPERATIVE NOS                     

 

 2016                         Ot            154.1            MALIGNANT 
NEOPL RECTUM                     

 

 2016                         Ot            V72.63            PRE-
PROCEDURAL LABORATORY EXAMINATION                     

 

 2016                         Ot            V74.8            SCREEN-
BACTERIAL DIS NEC                     

 

 2016            RAFY RAMAN ARNP            Ot            154.1    
        MALIGNANT NEOPL RECTUM                     

 

 2016            RAFY RAMAN ARNP            Ot            403.90   
         HYPTNSV CHR KID DIS, UNSPEC, W CHR KD ST                     

 

 2016            RAFY RAMAN S ARNP            Ot            585.9    
        CHRONIC KIDNEY DISEASE, UNSPECIFIED                     

 

 2016            RAFY RAMAN S ARNP            Ot            716.90   
         ARTHROPATHY NOS-UNSPEC                     

 

 2016            RAFY RAMAN S ARNP            Ot            746.4    
        BOB AORTA VALV INSUFFIC                     

 

 2016            RAFY RAMAN ARNP            Ot            V58.69   
         OTH MED,LT,CURRENT USE                     

 

 2016            ORENDER DO, NAT S            Ot            285.9 
           ANEMIA NOS                     

 

 2016            ORENDER DO, NAT S            Ot            586   
         RENAL FAILURE NOS                     

 

 2016            SCHOENFELD DO, ROGER H            Ot            788.20  
          RETENTION OF URINE NOS                     

 

 2016            STEPHANIE HERNANDEZ, ELIZA REED            Ot            V72.84    
        EXAM PRE-OPERATIVE NOS                     

 

 2016            ORENDER , NAT S            Ot            272.4 
           HYPERLIPIDEMIA NEC/NOS                     

 

 2016            ORENDER DO, NAT S            Ot            285.9 
           ANEMIA NOS                     

 

 2016            ORENDER DO, NAT S            Ot            780.79
            OTH MALAISE FATIGUE                     

 

 2016            ELANA YI MD            Ot            154.1          
  MALIGNANT NEOPL RECTUM                     

 

 2016            BROWNRASHAWN GUILLEN GREGG            Ot            266.2         
   B-COMPLEX DEFIC NEC                     

 

 2016            BROWN DO, GREGG            Ot            272.4         
   HYPERLIPIDEMIA NEC/NOS                     

 

 2016            KEVIN DO, GREGG            Ot            780.79        
    OTH MALAISE FATIGUE                     

 

 2016            KD HERNANDEZ, ELANA DUARTE            Ot            C20            
MALIGNANT NEOPLASM OF RECTUM                     

 

 2016            SCHOENFELD DO, ROGER H            Ot            Z12.5   
         ENCOUNTER FOR SCREENING FOR MALIGNANT NE                     

 

 2016            DHIRAJ GUILLEN NAT S            Ot            Z53.9 
           PROCEDURE AND TREATMENT NOT CARRIED OUT,                     

 

 2016            DHIRAJ GUILLEN NAT S            Ot            E78.2 
           MIXED HYPERLIPIDEMIA                     

 

 2016            DHIRAJ GUILLEN NAT S            Ot            R33.9 
           RETENTION OF URINE, UNSPECIFIED                     

 

 2016            KD HERNANDEZ, ELANA DUARTE            Ot            C20            
MALIGNANT NEOPLASM OF RECTUM                     

 

 2016            KD HERNANDEZ, ELANA DUARTE            Ot            I12.9          
  HYPERTENSIVE CHRONIC KIDNEY DISEASE W ST                     

 

 2016            KD HERNANDEZ, ELANA DUARTE            Ot            N18.9          
  CHRONIC KIDNEY DISEASE, UNSPECIFIED                     

 

 2016            KD HERNANDEZ, ELANA DUARTE            Ot            Z45.2          
  ENCOUNTER FOR ADJUSTMENT AND MANAGEMENT                      

 

 2016            KD HERNANDEZ, ELANA DUARTE            Ot            Z79.899        
    OTHER LONG TERM (CURRENT) DRUG THERAPY                     

 

 2016            SCHOENFELD DO, ROGER H            Ot            Z12.5   
         ENCOUNTER FOR SCREENING FOR MALIGNANT NE                     

 

 2016            SCHOENFELD DO, ROGER H            Ot            R33.9   
         RETENTION OF URINE, UNSPECIFIED                     

 

 2016            SCHOENFELD DO, ROGER H            Ot            Z12.5   
         ENCOUNTER FOR SCREENING FOR MALIGNANT NE                     

 

 2016            SCHOENFELD DO, ROGER H            Ot            R33.9   
         RETENTION OF URINE, UNSPECIFIED                     

 

 2016            SCHOENFELD DO, ROGER H            Ot            Z12.5   
         ENCOUNTER FOR SCREENING FOR MALIGNANT NE                     

 

 2016            SCHOENFELD DO, ROGER H            Ot            R33.9   
         RETENTION OF URINE, UNSPECIFIED                     

 

 2016            SCHOENFELD DO, ROGER H            Ot            Z12.5   
         ENCOUNTER FOR SCREENING FOR MALIGNANT NE                     

 

 2016                         Ot            401.9            HYPERTENSION 
NOS                     

 

 2016                         Ot            424.1            AORTIC VALVE 
DISORDER                     

 

 2016                         Ot            600.00            HYPERTROPHY 
(BENIGN) OF PROSTATE W/O URI                     

 

 2016                         Ot            780.79            OTH MALAISE 
FATIGUE                     

 

 2016                         Ot            V70.0            ROUTINE 
MEDICAL EXAM                     

 

 2016                         Ot            401.9            HYPERTENSION 
NOS                     

 

 2016                         Ot            V70.0            ROUTINE 
MEDICAL EXAM                     

 

 2016                         Ot            V72.62            LAB EXAM 
ORDERED AS PART OF A ROUTINE GE                     

 

 2016                         Ot            V72.84            EXAM PRE-
OPERATIVE NOS                     

 

 2016                         Ot            600.00            HYPERTROPHY 
(BENIGN) OF PROSTATE W/O URI                     

 

 2016                         Ot            V76.44            SCREEN MAL 
NEOP-PROSTATE                     

 

 2016                         Ot            V72.84            EXAM PRE-
OPERATIVE NOS                     

 

 2016                         Ot            154.1            MALIGNANT 
NEOPL RECTUM                     

 

 2016                         Ot            396.3            MITRAL/
AORTIC ISABEL INSUFF                     

 

 2016                         Ot            397.0            TRICUSPID 
VALVE DISEASE                     

 

 2016                         Ot            429.3            CARDIOMEGALY
                     

 

 2016                         Ot            154.1            MALIGNANT 
NEOPL RECTUM                     

 

 2016                         Ot            V72.81            EXAM-PRE-
OPERATIVE CARDIOVASCULAR                     

 

 2016                         Ot            V74.8            SCREEN-
BACTERIAL DIS NEC                     

 

 2016                         Ot            V72.84            EXAM PRE-
OPERATIVE NOS                     

 

 2016                         Ot            154.1            MALIGNANT 
NEOPL RECTUM                     

 

 2016                         Ot            V72.63            PRE-
PROCEDURAL LABORATORY EXAMINATION                     

 

 2016                         Ot            V74.8            SCREEN-
BACTERIAL DIS NEC                     

 

 2016            RAFY RAMAN ARNP            Ot            154.1    
        MALIGNANT NEOPL RECTUM                     

 

 2016            RAFY RAMAN ARNP            Ot            403.90   
         HYPTNSV CHR KID DIS, UNSPEC, W CHR KD ST                     

 

 2016            RAFY RAMAN S ARNP            Ot            585.9    
        CHRONIC KIDNEY DISEASE, UNSPECIFIED                     

 

 2016            RAFY RAMAN S ARNP            Ot            716.90   
         ARTHROPATHY NOS-UNSPEC                     

 

 2016            RAFY RAMAN S ARNP            Ot            746.4    
        BOB AORTA VALV INSUFFIC                     

 

 2016            RAFY RAMAN ARNP            Ot            V58.69   
         OT MED,LT,CURRENT USE                     

 

 2016            ORENDER DO, NAT S            Ot            285.9 
           ANEMIA NOS                     

 

 2016            ORENDER DO, NAT S            Ot            586   
         RENAL FAILURE NOS                     

 

 2016            SCHOENFELD DO, ROGER H            Ot            788.20  
          RETENTION OF URINE NOS                     

 

 2016            STEPHANIE HERNANDEZ, ELIZA REED            Ot            V72.84    
        EXAM PRE-OPERATIVE NOS                     

 

 2016            ORENDER DO, NAT S            Ot            272.4 
           HYPERLIPIDEMIA NEC/NOS                     

 

 2016            ORENDER DO, NAT S            Ot            285.9 
           ANEMIA NOS                     

 

 2016            VAMSHINDER DO, NAT S            Ot            780.79
            OT MALAISE FATIGUE                     

 

 2016            ELANA YI MD            Ot            154.1          
  MALIGNANT NEOPL RECTUM                     

 

 2016            GREGG BROWN DO            Ot            266.2         
   B-COMPLEX DEFIC NEC                     

 

 2016            GREGG BROWN DO            Ot            272.4         
   HYPERLIPIDEMIA NEC/NOS                     

 

 2016            GREGG BROWN DO            Ot            780.79        
    OTH MALAISE FATIGUE                     

 

 2016            ELANA YI MD, Ot            C20            
MALIGNANT NEOPLASM OF RECTUM                     

 

 2016            SCHOENFELD DO, ROGER H Ot            Z12.5   
         ENCOUNTER FOR SCREENING FOR MALIGNANT NE                     

 

 2016            NAT INIGUEZ DO            Ot            Z53.9 
           PROCEDURE AND TREATMENT NOT CARRIED OUT,                     

 

 2016            ELANA YI MD, Ot            C20            
MALIGNANT NEOPLASM OF RECTUM                     

 

 2016            ELANA YI MD, Ot            J44.9          
  CHRONIC OBSTRUCTIVE PULMONARY DISEASE, U                     

 

 2016            ELANA YI MD, Ot            K43.5          
  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                     

 

 2016            ELANA YI MD, Ot            K76.0          
  FATTY (CHANGE OF) LIVER, NOT ELSEWHERE C                     

 

 2016            ELANA YI MD, Ot            K80.20         
   CALCULUS OF GALLBLADDER W/O CHOLECYSTITI                     

 

 2016            ELANA YI MD, Ot            K86.89         
   OTHER SPECIFIED DISEASES OF PANCREAS                     

 

 2016            NAT INIGUEZ DO            Ot            E78.2 
           MIXED HYPERLIPIDEMIA                     

 

 2016            NAT INIGUEZ DO            Ot            R33.9 
           RETENTION OF URINE, UNSPECIFIED                     

 

 2016            ELANA YI MD, Ot            C20            
MALIGNANT NEOPLASM OF RECTUM                     

 

 2016            ELANA YI MD, Ot            I12.9          
  HYPERTENSIVE CHRONIC KIDNEY DISEASE W ST                     

 

 2016            ELANA YI MD, Ot            N18.9          
  CHRONIC KIDNEY DISEASE, UNSPECIFIED                     

 

 2016            ELANA YI MD            Ot            Z45.2          
  ENCOUNTER FOR ADJUSTMENT AND MANAGEMENT                      

 

 2016            ELANA YI MD, Ot            Z79.899        
    OTHER LONG TERM (CURRENT) DRUG THERAPY                     

 

 2016            SCHOENFELD DO, ROGER H Ot            R33.9   
         RETENTION OF URINE, UNSPECIFIED                     

 

 2016            SCHOENFELD DO, ROGER H Ot            Z12.5   
         ENCOUNTER FOR SCREENING FOR MALIGNANT NE                     

 

 2016            ELANA YI MD, Ot            C20            
MALIGNANT NEOPLASM OF RECTUM                     

 

 2016            ELANA YI MD, Ot            J44.9          
  CHRONIC OBSTRUCTIVE PULMONARY DISEASE, U                     

 

 2016            ELANA YI MD            Ot            K43.5          
  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                     

 

 2016            ELANA YI MD            Ot            K76.0          
  FATTY (CHANGE OF) LIVER, NOT ELSEWHERE C                     

 

 2016            ELANA YI MD            Ot            K80.20         
   CALCULUS OF GALLBLADDER W/O CHOLECYSTITI                     

 

 2016            ELANA YI MD            Ot            K86.89         
   OTHER SPECIFIED DISEASES OF PANCREAS                     

 

 2016            SCHOENFELDMAURA KHALIL DO            Ot            R33.9   
         RETENTION OF URINE, UNSPECIFIED                     

 

 2016            SCHOENFELD DO, ROGER H            Ot            Z12.5   
         ENCOUNTER FOR SCREENING FOR MALIGNANT NE                     

 

 2016                         Ot            401.9            HYPERTENSION 
NOS                     

 

 2016                         Ot            424.1            AORTIC VALVE 
DISORDER                     

 

 2016                         Ot            600.00            HYPERTROPHY 
(BENIGN) OF PROSTATE W/O URI                     

 

 2016                         Ot            780.79            OTH MALAISE 
FATIGUE                     

 

 2016                         Ot            V70.0            ROUTINE 
MEDICAL EXAM                     

 

 2016                         Ot            401.9            HYPERTENSION 
NOS                     

 

 2016                         Ot            V70.0            ROUTINE 
MEDICAL EXAM                     

 

 2016                         Ot            V72.62            LAB EXAM 
ORDERED AS PART OF A ROUTINE GE                     

 

 2016                         Ot            V72.84            EXAM PRE-
OPERATIVE NOS                     

 

 2016                         Ot            600.00            HYPERTROPHY 
(BENIGN) OF PROSTATE W/O URI                     

 

 2016                         Ot            V76.44            SCREEN MAL 
NEOP-PROSTATE                     

 

 2016                         Ot            V72.84            EXAM PRE-
OPERATIVE NOS                     

 

 2016                         Ot            154.1            MALIGNANT 
NEOPL RECTUM                     

 

 2016                         Ot            396.3            MITRAL/
AORTIC ISABEL INSUFF                     

 

 2016                         Ot            397.0            TRICUSPID 
VALVE DISEASE                     

 

 2016                         Ot            429.3            CARDIOMEGALY
                     

 

 2016                         Ot            154.1            MALIGNANT 
NEOPL RECTUM                     

 

 2016                         Ot            V72.81            EXAM-PRE-
OPERATIVE CARDIOVASCULAR                     

 

 2016                         Ot            V74.8            SCREEN-
BACTERIAL DIS NEC                     

 

 2016                         Ot            V72.84            EXAM PRE-
OPERATIVE NOS                     

 

 2016                         Ot            154.1            MALIGNANT 
NEOPL RECTUM                     

 

 2016                         Ot            V72.63            PRE-
PROCEDURAL LABORATORY EXAMINATION                     

 

 2016                         Ot            V74.8            SCREEN-
BACTERIAL DIS NEC                     

 

 2016            RAFY RAMAN ARNP            Ot            154.1    
        MALIGNANT NEOPL RECTUM                     

 

 2016            DANISHA RAFY JACOBS ARNP            Ot            403.90   
         HYPTNSV CHR KID DIS, UNSPEC, W CHR KD ST                     

 

 2016            DANISHA RAFY JACOBS ARNP            Ot            585.9    
        CHRONIC KIDNEY DISEASE, UNSPECIFIED                     

 

 2016            DANISHA RAFY JACOBS ARNP            Ot            716.90   
         ARTHROPATHY NOS-UNSPEC                     

 

 2016            DANISHA RAFY JACOBS ARNP            Ot            746.4    
        BOB AORTA VALV INSUFFIC                     

 

 2016            RAMAN RAFY JACOBS ARNP            Ot            V58.69   
         OTH MED,LT,CURRENT USE                     

 

 2016            ORENDER DO NAT S            Ot            285.9 
           ANEMIA NOS                     

 

 2016            VAMSHINDER DO NAT S            Ot            586   
         RENAL FAILURE NOS                     

 

 2016            SCHOENFELD DO, ROGER H            Ot            788.20  
          RETENTION OF URINE NOS                     

 

 2016            STEPHANIE HERNANDEZ, ELIZA REED            Ot            V72.84    
        EXAM PRE-OPERATIVE NOS                     

 

 2016            VAMSHINDAILYN GUILLEN NAT S            Ot            272.4 
           HYPERLIPIDEMIA NEC/NOS                     

 

 2016            VAMSHINDER DO, NAT S            Ot            285.9 
           ANEMIA NOS                     

 

 2016            VAMSHINDER DO, NAT S            Ot            780.79
            OTH MALAISE FATIGUE                     

 

 2016            KD HERNANDEZ, ELANA DUARTE            Ot            154.1          
  MALIGNANT NEOPL RECTUM                     

 

 2016            KEVIN GUILLEN GREGG            Ot            266.2         
   B-COMPLEX DEFIC NEC                     

 

 2016            KEVIN GUILLEN GREGG            Ot            272.4         
   HYPERLIPIDEMIA NEC/NOS                     

 

 2016            KEVIN GUILLEN GREGG            Ot            780.79        
    OTH MALAISE FATIGUE                     

 

 2016            KD HERNANDEZ, ELANA DUARTE            Ot            C20            
MALIGNANT NEOPLASM OF RECTUM                     

 

 2016            SCHOENFELD DO, ROGER H            Ot            Z12.5   
         ENCOUNTER FOR SCREENING FOR MALIGNANT NE                     

 

 2016            NAT INIGUEZ DO S            Ot            Z53.9 
           PROCEDURE AND TREATMENT NOT CARRIED OUT,                     

 

 2016            KD HERNANDEZ, ELANA DUARTE            Ot            C20            
MALIGNANT NEOPLASM OF RECTUM                     

 

 2016            KD HERNANDEZ, ELANA DUARTE            Ot            J44.9          
  CHRONIC OBSTRUCTIVE PULMONARY DISEASE, U                     

 

 2016            KD HERNANDEZ, ELANA DUARTE            Ot            K43.5          
  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                     

 

 2016            ELANA YI MD, Ot            K76.0          
  FATTY (CHANGE OF) LIVER, NOT ELSEWHERE C                     

 

 2016            ELANA YI MD            Ot            K80.20         
   CALCULUS OF GALLBLADDER W/O CHOLECYSTITI                     

 

 2016            ELANA YI MD            Ot            K86.89         
   OTHER SPECIFIED DISEASES OF PANCREAS                     

 

 2016            VAMSHINDER DO NAT S            Ot            E78.2 
           MIXED HYPERLIPIDEMIA                     

 

 2016            ORENDER DO, NAT S            Ot            R33.9 
           RETENTION OF URINE, UNSPECIFIED                     

 

 2016            ELANA IY MD            Ot            C20            
MALIGNANT NEOPLASM OF RECTUM                     

 

 2016            ELANA YI MD, Ot            I12.9          
  HYPERTENSIVE CHRONIC KIDNEY DISEASE W ST                     

 

 2016            ELANA YI MD, Ot            N18.9          
  CHRONIC KIDNEY DISEASE, UNSPECIFIED                     

 

 2016            ELANA YI MD            Ot            Z45.2          
  ENCOUNTER FOR ADJUSTMENT AND MANAGEMENT                      

 

 2016            ELANA YI MD            Ot            Z79.899        
    OTHER LONG TERM (CURRENT) DRUG THERAPY                     

 

 2016            SCHOENFELDMAURA SAMANIEGO DO            Ot            R33.9   
         RETENTION OF URINE, UNSPECIFIED                     

 

 2016            SCHOENFELDMAURA KHALIL DO            Ot            Z12.5   
         ENCOUNTER FOR SCREENING FOR MALIGNANT NE                     

 

 2016            ORENDER DO, NAT S            Ot            
H53.133            SUDDEN VISUAL LOSS, BILATERAL                     

 

 2016            ORENDER DO, NAT S            Ot            
H53.133            SUDDEN VISUAL LOSS, BILATERAL                     

 

 2016            ORENDER DO, NAT S            Ot            
H53.133            SUDDEN VISUAL LOSS, BILATERAL                     

 

 2016            ORENDER DO, NAT S            Ot            
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                     

 

 2016            ORENDER DO, NAT S            Ot            
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                     

 

 2017            ELANA YI MD            Ot            C20            
MALIGNANT NEOPLASM OF RECTUM                     

 

 2017            ELANA YI MD            Ot            I12.9          
  HYPERTENSIVE CHRONIC KIDNEY DISEASE W ST                     

 

 2017            ELANA YI MD, Ot            N18.9          
  CHRONIC KIDNEY DISEASE, UNSPECIFIED                     

 

 2017            ELANA YI MD            Ot            Z45.2          
  ENCOUNTER FOR ADJUSTMENT AND MANAGEMENT                      

 

 2017            ELANA YI MD            Ot            Z79.899        
    OTHER LONG TERM (CURRENT) DRUG THERAPY                     

 

 2017            KD MD, ELANA K            Ot            C20            
MALIGNANT NEOPLASM OF RECTUM                     

 

 2017            ELANA YI MD            Ot            I12.9          
  HYPERTENSIVE CHRONIC KIDNEY DISEASE W ST                     

 

 2017            ELANA YI MD            Ot            N18.9          
  CHRONIC KIDNEY DISEASE, UNSPECIFIED                     

 

 2017            ELANA YI MD            Ot            Z45.2          
  ENCOUNTER FOR ADJUSTMENT AND MANAGEMENT                      

 

 2017            ELANA YI MD            Ot            Z79.899        
    OTHER LONG TERM (CURRENT) DRUG THERAPY                     

 

 2017            ELANA YI MD            Ot            C20            
MALIGNANT NEOPLASM OF RECTUM                     

 

 2017            ELANA YI MD            Ot            I12.9          
  HYPERTENSIVE CHRONIC KIDNEY DISEASE W ST                     

 

 2017            ELANA YI MD            Ot            N18.9          
  CHRONIC KIDNEY DISEASE, UNSPECIFIED                     

 

 2017            ELANA YI MD            Ot            Z45.2          
  ENCOUNTER FOR ADJUSTMENT AND MANAGEMENT                      

 

 2017            ELANA YI MD, Ot            Z79.899        
    OTHER LONG TERM (CURRENT) DRUG THERAPY                     

 

 2017            DHIRAJ GUILLEN NAT S            Ot            
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                     

 

 2017            ELANA YI MD            Ot            C20            
MALIGNANT NEOPLASM OF RECTUM                     

 

 2017            ELANA YI MD            Ot            I12.9          
  HYPERTENSIVE CHRONIC KIDNEY DISEASE W ST                     

 

 2017            ELANA YI MD            Ot            N18.9          
  CHRONIC KIDNEY DISEASE, UNSPECIFIED                     

 

 2017            ELANA YI MD            Ot            Z45.2          
  ENCOUNTER FOR ADJUSTMENT AND MANAGEMENT                      

 

 2017            ELANA YI MD            Ot            Z79.899        
    OTHER LONG TERM (CURRENT) DRUG THERAPY                     

 

 2017            ELANA YI MD            Ot            C20            
MALIGNANT NEOPLASM OF RECTUM                     

 

 2017            ELANA YI MD            Ot            I12.9          
  HYPERTENSIVE CHRONIC KIDNEY DISEASE W ST                     

 

 2017            ELANA YI MD            Ot            N18.9          
  CHRONIC KIDNEY DISEASE, UNSPECIFIED                     

 

 2017            ELANA YI MD            Ot            Z45.2          
  ENCOUNTER FOR ADJUSTMENT AND MANAGEMENT                      

 

 2017            ELANA YI MD            Ot            Z79.899        
    OTHER LONG TERM (CURRENT) DRUG THERAPY                     

 

 2017            ELANA YI MD            Ot            C20            
MALIGNANT NEOPLASM OF RECTUM                     

 

 2017            ELANA YI MD            Ot            I12.9          
  HYPERTENSIVE CHRONIC KIDNEY DISEASE W ST                     

 

 2017            ELANA YI MD            Ot            N18.9          
  CHRONIC KIDNEY DISEASE, UNSPECIFIED                     

 

 2017            ELANA YI MD            Ot            Z45.2          
  ENCOUNTER FOR ADJUSTMENT AND MANAGEMENT                      

 

 2017            ELANA YI MD            Ot            Z79.899        
    OTHER LONG TERM (CURRENT) DRUG THERAPY                     

 

 2017            ELANA YI MD            Ot            C20            
MALIGNANT NEOPLASM OF RECTUM                     

 

 2017            ELANA YI MD            Ot            I12.9          
  HYPERTENSIVE CHRONIC KIDNEY DISEASE W ST                     

 

 2017            ELANA YI MD            Ot            N18.9          
  CHRONIC KIDNEY DISEASE, UNSPECIFIED                     

 

 2017            ELANA YI MD            Ot            Z79.899        
    OTHER LONG TERM (CURRENT) DRUG THERAPY                     

 

 06/15/2017            ORENDER DO, NAT S            Ot            
R29.890            LOSS OF HEIGHT                     

 

 2017            ELANA YI MD            Ot            C20            
MALIGNANT NEOPLASM OF RECTUM                     

 

 2017            ELANA YI MD            Ot            I12.9          
  HYPERTENSIVE CHRONIC KIDNEY DISEASE W ST                     

 

 2017            ELANA YI MD            Ot            N18.9          
  CHRONIC KIDNEY DISEASE, UNSPECIFIED                     

 

 2017            ELANA YI MD            Ot            Z79.899        
    OTHER LONG TERM (CURRENT) DRUG THERAPY                     

 

 2017            ORENDER DO, NAT S            Ot            M85.89
            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                     

 

 2017            ORENDER DO, NAT S            Ot            M85.89
            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                     

 

 2017            ORENDER DO, NAT S            Ot            E78.5 
           HYPERLIPIDEMIA, UNSPECIFIED                     

 

 2017            ORENDER DO, NAT S            Ot            M85.89
            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                     

 

 2017            ORENDER DO, NAT S            Ot            
R29.890            LOSS OF HEIGHT                     

 

 2017            ORENDER DO, NAT S            Ot            R73.9 
           HYPERGLYCEMIA, UNSPECIFIED                     

 

 2017            ORENDER DO, NAT S            Ot            E78.5 
           HYPERLIPIDEMIA, UNSPECIFIED                     

 

 2017            ORENDER DO, NAT S            Ot            M85.89
            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                     

 

 2017            ORENDER DO, NAT S            Ot            
R29.890            LOSS OF HEIGHT                     

 

 2017            ORENDER DO, NAT S            Ot            R73.9 
           HYPERGLYCEMIA, UNSPECIFIED                     

 

 2017            ORENDER DO, NAT S            Ot            E78.5 
           HYPERLIPIDEMIA, UNSPECIFIED                     

 

 2017            ORENDER DO, NAT S            Ot            M85.89
            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                     

 

 2017            ORENDER DO, NAT S            Ot            
R29.890            LOSS OF HEIGHT                     

 

 2017            ORENDER DO, NAT S            Ot            R73.9 
           HYPERGLYCEMIA, UNSPECIFIED                     

 

 2017            ORENDER DO, NAT S            Ot            E78.5 
           HYPERLIPIDEMIA, UNSPECIFIED                     

 

 2017            ORENDER DO, NAT S            Ot            M85.89
            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                     

 

 2017            ORENDER DO, NAT S            Ot            
R29.890            LOSS OF HEIGHT                     

 

 2017            VAMSHINDER DO, NAT S            Ot            R73.9 
           HYPERGLYCEMIA, UNSPECIFIED                     

 

 2017            ELANA YI MD, Ot            C20            
MALIGNANT NEOPLASM OF RECTUM                     

 

 2017            ELANA YI MD, Ot            I12.9          
  HYPERTENSIVE CHRONIC KIDNEY DISEASE W ST                     

 

 2017            ELANA YI MD, Ot            N18.9          
  CHRONIC KIDNEY DISEASE, UNSPECIFIED                     

 

 2017            ELANA YI MD, Ot            Z45.2          
  ENCOUNTER FOR ADJUSTMENT AND MANAGEMENT                      

 

 2017            ELANA YI MD, Ot            Z79.899        
    OTHER LONG TERM (CURRENT) DRUG THERAPY                     

 

 2017            ELANA YI MD, Ot            C20            
MALIGNANT NEOPLASM OF RECTUM                     

 

 2017            ELANA IY MD, Ot            I12.9          
  HYPERTENSIVE CHRONIC KIDNEY DISEASE W ST                     

 

 2017            ELANA YI MD, Ot            N18.9          
  CHRONIC KIDNEY DISEASE, UNSPECIFIED                     

 

 2017            ELANA YI MD            Ot            Z45.2          
  ENCOUNTER FOR ADJUSTMENT AND MANAGEMENT                      

 

 2017            ELANA YI MD, Ot            Z79.899        
    OTHER LONG TERM (CURRENT) DRUG THERAPY                     

 

 2017            MARK PRADHAN MD, Ot            C20         
   MALIGNANT NEOPLASM OF RECTUM                     

 

 2017            MARK PRADHAN MD, Ot            I12.9       
     HYPERTENSIVE CHRONIC KIDNEY DISEASE W ST                     

 

 2017            MARK PRADHAN MD, Ot            N18.9       
     CHRONIC KIDNEY DISEASE, UNSPECIFIED                     

 

 2017            MARK PRADHAN MD, Ot            Z45.2       
     ENCOUNTER FOR ADJUSTMENT AND MANAGEMENT                      

 

 2017            MARK PRADHAN MD, Ot            Z79.899     
       OTHER LONG TERM (CURRENT) DRUG THERAPY                     

 

 2017            MARK PRADHAN MD, Ot            C20         
   MALIGNANT NEOPLASM OF RECTUM                     

 

 2017            MARK PRADHAN MD            Ot            I12.9       
     HYPERTENSIVE CHRONIC KIDNEY DISEASE W ST                     

 

 2017            MARK PRADHAN MD, Ot            N18.9       
     CHRONIC KIDNEY DISEASE, UNSPECIFIED                     

 

 2017            MARK PRADHAN MD, Ot            Z45.2       
     ENCOUNTER FOR ADJUSTMENT AND MANAGEMENT                      

 

 2017            MARK PRADHAN MD, Ot            Z79.899     
       OTHER LONG TERM (CURRENT) DRUG THERAPY                     

 

 10/18/2017            MARK PRADHAN MD, Ot            C20         
   MALIGNANT NEOPLASM OF RECTUM                     

 

 10/18/2017            MARK PRADHAN MD, Ot            I12.9       
     HYPERTENSIVE CHRONIC KIDNEY DISEASE W ST                     

 

 10/18/2017            MARK PRADHAN MD, Ot            N18.9       
     CHRONIC KIDNEY DISEASE, UNSPECIFIED                     

 

 10/18/2017            MARK PRADHAN MD, Ot            Z45.2       
     ENCOUNTER FOR ADJUSTMENT AND MANAGEMENT                      

 

 10/18/2017            MARK PRADHAN MD, Ot            Z79.899     
       OTHER LONG TERM (CURRENT) DRUG THERAPY                     

 

 2017            MARK PRADHAN MD, Ot            C20         
   MALIGNANT NEOPLASM OF RECTUM                     

 

 2017            MARK PRADHAN MD, Ot            I12.9       
     HYPERTENSIVE CHRONIC KIDNEY DISEASE W ST                     

 

 2017            MARK PRADHAN MD, Ot            N18.9       
     CHRONIC KIDNEY DISEASE, UNSPECIFIED                     

 

 2017            MARK PRADHAN MD, Ot            Z45.2       
     ENCOUNTER FOR ADJUSTMENT AND MANAGEMENT                      

 

 2017            MARK PRADHAN MD, Ot            Z79.899     
       OTHER LONG TERM (CURRENT) DRUG THERAPY                     

 

 2017            MECCA MARTÍNEZ MD, Ot            C20            
MALIGNANT NEOPLASM OF RECTUM                     

 

 2017            MECCA MARTÍNEZ MD, Ot            I12.9          
  HYPERTENSIVE CHRONIC KIDNEY DISEASE W ST                     

 

 2017            EMCCA MARTÍNEZ MD            Ot            N18.9          
  CHRONIC KIDNEY DISEASE, UNSPECIFIED                     

 

 2017            MECCA MARTÍNEZ MD, Ot            Z79.899        
    OTHER LONG TERM (CURRENT) DRUG THERAPY                     

 

 2017            MECCA MARTÍNEZ MD            Ot            E78.5          
  HYPERLIPIDEMIA, UNSPECIFIED                     

 

 2017            MECCA MARTÍNEZ MD, Ot            C20            
MALIGNANT NEOPLASM OF RECTUM                     

 

 2017            MECCA MARTÍNEZ MD            Ot            I12.9          
  HYPERTENSIVE CHRONIC KIDNEY DISEASE W ST                     

 

 2017            MECCA MARTÍNEZ MD            Ot            N18.9          
  CHRONIC KIDNEY DISEASE, UNSPECIFIED                     

 

 2017            MECCA MARTÍNEZ MD            Ot            Z79.899        
    OTHER LONG TERM (CURRENT) DRUG THERAPY                     

 

 2018            SCHOENFELDMAURA KHALIL DO H            Ot            Z12.5   
         ENCOUNTER FOR SCREENING FOR MALIGNANT NE                     

 

 01/10/2018            SCHOENFELD DO, ROGER H            Ot            Z12.5   
         ENCOUNTER FOR SCREENING FOR MALIGNANT NE                     

 

 2018            DHIRAJ GUILLEN NAT S            Ot            M81.0 
           AGE-RELATED OSTEOPOROSIS W/O CURRENT PAT                     

 

 2018            SCHOENFELD DO, ROGER H            Ot            Z12.5   
         ENCOUNTER FOR SCREENING FOR MALIGNANT NE                     

 

 2018            MECCA MARTÍNEZ MD            Ot            C20            
MALIGNANT NEOPLASM OF RECTUM                     

 

 2018            MECCA MARTÍNEZ MD            Ot            I12.9          
  HYPERTENSIVE CHRONIC KIDNEY DISEASE W ST                     

 

 2018            MECCA MARTÍNEZ MD            Ot            N18.9          
  CHRONIC KIDNEY DISEASE, UNSPECIFIED                     

 

 2018            MECCA MARTÍNEZ MD            Ot            Z45.2          
  ENCOUNTER FOR ADJUSTMENT AND MANAGEMENT                      

 

 2018            MECCA MARTÍNEZ MD            Ot            Z79.899        
    OTHER LONG TERM (CURRENT) DRUG THERAPY                     

 

 2018            MECCA MARTÍNEZ MD            Ot            C20            
MALIGNANT NEOPLASM OF RECTUM                     

 

 2018            MECCA MARTÍNEZ MD            Ot            I12.9          
  HYPERTENSIVE CHRONIC KIDNEY DISEASE W ST                     

 

 2018            MECCA MARTÍNEZ MD            Ot            N18.9          
  CHRONIC KIDNEY DISEASE, UNSPECIFIED                     

 

 2018            MECCA MARTÍNEZ MD            Ot            Z45.2          
  ENCOUNTER FOR ADJUSTMENT AND MANAGEMENT                      

 

 2018            MECCA MARTÍNEZ MD            Ot            Z79.899        
    OTHER LONG TERM (CURRENT) DRUG THERAPY                     

 

 2018            MECCA MARTÍNEZ MD            Ot            C20            
MALIGNANT NEOPLASM OF RECTUM                     

 

 2018            MECCA MARTÍNEZ MD            Ot            I12.9          
  HYPERTENSIVE CHRONIC KIDNEY DISEASE W ST                     

 

 2018            MECCA MARTÍNEZ MD            Ot            N18.9          
  CHRONIC KIDNEY DISEASE, UNSPECIFIED                     

 

 2018            MECCA MARTÍNEZ MD            Ot            Z45.2          
  ENCOUNTER FOR ADJUSTMENT AND MANAGEMENT                      

 

 2018            MECCA MARTÍNEZ MD            Ot            Z79.899        
    OTHER LONG TERM (CURRENT) DRUG THERAPY                     

 

 2018            RUFUS URIARTE            Ot            C20            
MALIGNANT NEOPLASM OF RECTUM                     

 

 2018            RUFUS URIARTE            Ot            I12.9          
  HYPERTENSIVE CHRONIC KIDNEY DISEASE W ST                     

 

 2018            RFUUS URIARTE            Ot            N18.9          
  CHRONIC KIDNEY DISEASE, UNSPECIFIED                     

 

 2018            RUFUS URIARTE            Ot            Z79.899        
    OTHER LONG TERM (CURRENT) DRUG THERAPY                     

 

 2018            KALANI, BOBAN N            Ot            C20            
MALIGNANT NEOPLASM OF RECTUM                     

 

 2018            RUFUS URIARTE N            Ot            I12.9          
  HYPERTENSIVE CHRONIC KIDNEY DISEASE W ST                     

 

 2018            RUFUS URIARTE N            Ot            N18.9          
  CHRONIC KIDNEY DISEASE, UNSPECIFIED                     

 

 2018            KALANIRUFUS ALVARADO N            Ot            Z45.2          
  ENCOUNTER FOR ADJUSTMENT AND MANAGEMENT                      

 

 2018            RUFUS URIARTE N            Ot            Z79.899        
    OTHER LONG TERM (CURRENT) DRUG THERAPY                     

 

 2018            RUFUS URIARTE N            Ot            C20            
MALIGNANT NEOPLASM OF RECTUM                     

 

 2018            RUFUS URIARTE N            Ot            I12.9          
  HYPERTENSIVE CHRONIC KIDNEY DISEASE W ST                     

 

 2018            RUFUS URIARTE N            Ot            N18.9          
  CHRONIC KIDNEY DISEASE, UNSPECIFIED                     

 

 2018            RUFUS URIARTE N            Ot            Z45.2          
  ENCOUNTER FOR ADJUSTMENT AND MANAGEMENT                      

 

 2018            RUFUS URIARTE N            Ot            Z79.899        
    OTHER LONG TERM (CURRENT) DRUG THERAPY                     

 

 07/10/2018            TANYA HERNANDEZ, MECCA            Ot            C20            
MALIGNANT NEOPLASM OF RECTUM                     

 

 07/10/2018            MECCA MARTÍNEZ MD            Ot            I12.9          
  HYPERTENSIVE CHRONIC KIDNEY DISEASE W ST                     

 

 07/10/2018            MECCA MARTÍNEZ MD            Ot            N18.9          
  CHRONIC KIDNEY DISEASE, UNSPECIFIED                     

 

 07/10/2018            EMCCA MARTÍNEZ MD            Ot            Z79.899        
    OTHER LONG TERM (CURRENT) DRUG THERAPY                     

 

 07/10/2018            RUFUS URIARTE N            Ot            C20            
MALIGNANT NEOPLASM OF RECTUM                     

 

 07/10/2018            RUFUS URIARTE N            Ot            I12.9          
  HYPERTENSIVE CHRONIC KIDNEY DISEASE W ST                     

 

 07/10/2018            RUFUS URIARTE N            Ot            N18.9          
  CHRONIC KIDNEY DISEASE, UNSPECIFIED                     

 

 07/10/2018            RUFUS URIARTE N            Ot            Z79.899        
    OTHER LONG TERM (CURRENT) DRUG THERAPY                     

 

 07/10/2018                         Ot            V72.84            EXAM PRE-
OPERATIVE NOS                     

 

 07/10/2018                         Ot            154.1            MALIGNANT 
NEOPL RECTUM                     

 

 07/10/2018                         Ot            V72.63            PRE-
PROCEDURAL LABORATORY EXAMINATION                     

 

 07/10/2018                         Ot            V74.8            SCREEN-
BACTERIAL DIS NEC                     

 

 07/10/2018            RAFY RAMAN ARNP            Ot            154.1    
        MALIGNANT NEOPL RECTUM                     

 

 07/10/2018            RAFY RAMAN ARNP            Ot            403.90   
         HYPTNSV CHR KID DIS, UNSPEC, W CHR KD ST                     

 

 07/10/2018            RAFY RAMAN ARNP            Ot            585.9    
        CHRONIC KIDNEY DISEASE, UNSPECIFIED                     

 

 07/10/2018            RAFY RAMAN ARNP            Ot            716.90   
         ARTHROPATHY NOS-UNSPEC                     

 

 07/10/2018            RAFY RAMAN ARNP            Ot            746.4    
        BOB AORTA VALV INSUFFIC                     

 

 07/10/2018            RAFY RAMAN ARNP            Ot            V58.69   
         OTH MED,LT,CURRENT USE                     

 

 07/10/2018            ORENDER DO, NAT S            Ot            285.9 
           ANEMIA NOS                     

 

 07/10/2018            ORENDER DO, NAT S            Ot            586   
         RENAL FAILURE NOS                     

 

 07/10/2018            SCHOENFELD DO, ROGER H            Ot            788.20  
          RETENTION OF URINE NOS                     

 

 07/10/2018            STEPHANIE HERNANDEZ, ELIZA REED            Ot            V72.84    
        EXAM PRE-OPERATIVE NOS                     

 

 07/10/2018            VAMSHINDER DODENNYSNAT S            Ot            272.4 
           HYPERLIPIDEMIA NEC/NOS                     

 

 07/10/2018            VAMSHINDER DO, NAT S            Ot            285.9 
           ANEMIA NOS                     

 

 07/10/2018            ORENDER DO, NAT S            Ot            780.79
            OTH MALAISE FATIGUE                     

 

 07/10/2018            ELANA YI MD            Ot            154.1          
  MALIGNANT NEOPL RECTUM                     

 

 07/10/2018            KEVIN GUILLEN GREGG            Ot            266.2         
   B-COMPLEX DEFIC NEC                     

 

 07/10/2018            KEVIN GUILLEN GREGG            Ot            272.4         
   HYPERLIPIDEMIA NEC/NOS                     

 

 07/10/2018            KEVIN GUILLEN GREGG            Ot            780.79        
    OTH MALAISE FATIGUE                     

 

 07/10/2018            ELANA YI MD            Ot            C20            
MALIGNANT NEOPLASM OF RECTUM                     

 

 07/10/2018            SCHOENFELD DO, ROGER H            Ot            Z12.5   
         ENCOUNTER FOR SCREENING FOR MALIGNANT NE                     

 

 07/10/2018            NAT INIGUEZ DO S            Ot            Z53.9 
           PROCEDURE AND TREATMENT NOT CARRIED OUT,                     

 

 07/10/2018            ELANA YI MD            Ot            C20            
MALIGNANT NEOPLASM OF RECTUM                     

 

 07/10/2018            ELANA YI MD            Ot            J44.9          
  CHRONIC OBSTRUCTIVE PULMONARY DISEASE, U                     

 

 07/10/2018            LEANA YI MD            Ot            K43.5          
  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                     

 

 07/10/2018            ELANA YI MD            Ot            K76.0          
  FATTY (CHANGE OF) LIVER, NOT ELSEWHERE C                     

 

 07/10/2018            ELANA YI MD            Ot            K80.20         
   CALCULUS OF GALLBLADDER W/O CHOLECYSTITI                     

 

 07/10/2018            ELANA YI MD            Ot            K86.89         
   OTHER SPECIFIED DISEASES OF PANCREAS                     

 

 07/10/2018            VAMSHINDER DO, NAT S            Ot            E78.2 
           MIXED HYPERLIPIDEMIA                     

 

 07/10/2018            VAMSHINDER DO, NAT S            Ot            R33.9 
           RETENTION OF URINE, UNSPECIFIED                     

 

 07/10/2018            SCHOENFELD DO, MAURA H            Ot            R33.9   
         RETENTION OF URINE, UNSPECIFIED                     

 

 07/10/2018            SCHOENFELD DO, MAURA H            Ot            Z12.5   
         ENCOUNTER FOR SCREENING FOR MALIGNANT NE                     

 

 07/10/2018            VAMSHINDER DO, NAT S            Ot            
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                     

 

 07/10/2018            VAMSHINDER DO, NAT S            Ot            E78.5 
           HYPERLIPIDEMIA, UNSPECIFIED                     

 

 07/10/2018            ORENDER DO, NAT S            Ot            M85.89
            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                     

 

 07/10/2018            VAMSHINDER DO, NAT S            Ot            
R29.890            LOSS OF HEIGHT                     

 

 07/10/2018            VAMSHINDER DO, NAT S            Ot            R73.9 
           HYPERGLYCEMIA, UNSPECIFIED                     

 

 07/10/2018            MECCA MARTÍNEZ MD            Ot            C20            
MALIGNANT NEOPLASM OF RECTUM                     

 

 07/10/2018            MECCA MARTÍNEZ MD            Ot            E78.5          
  HYPERLIPIDEMIA, UNSPECIFIED                     

 

 07/10/2018            SCHOENFELD DO, MAURA H            Ot            Z12.5   
         ENCOUNTER FOR SCREENING FOR MALIGNANT NE                     

 

 07/10/2018            VAMSHINDER , NAT S            Ot            M81.0 
           AGE-RELATED OSTEOPOROSIS W/O CURRENT PAT                     

 

 2018            STEPHANIE HERNANDEZ, ELIZA REED            Ot            C20       
     MALIGNANT NEOPLASM OF RECTUM                     

 

 2018            STEPHANIE HERNANDEZ, ELIZA REED            Ot            Z01.818   
         ENCOUNTER FOR OTHER PREPROCEDURAL EXAMIN                     

 

 2018            MECCA MARTÍNEZ MD, Ot            C20            
MALIGNANT NEOPLASM OF RECTUM                     

 

 2018            STEPHANIE HERNANDEZ, ELIAZ REED            Ot            C20       
     MALIGNANT NEOPLASM OF RECTUM                     

 

 2018            STEPHANIE HERNANDEZ, ELIZA REED            Ot            Z01.818   
         ENCOUNTER FOR OTHER PREPROCEDURAL EXAMIN                     

 

 2018            ELIZA BROWN MD            Ot            C20       
     MALIGNANT NEOPLASM OF RECTUM                     

 

 2018            STEPHANIE HERNANDEZ, ELIZA REED            Ot            Z01.818   
         ENCOUNTER FOR OTHER PREPROCEDURAL EXAMIN                     



                                                                               
                                                                               
                                                                               
                                                                               
                                                                               
                                                                               
                                                                               
                                                                               
                                                                               
                                                                               
                                                                               
                                                                               
                                                                               
                                                                               
                                                                               
                                                                               
                                                                               
                           



Procedures

      





 Code            Description            Performed By            Performed On   
     

 

             46.01                                  SM BOWEL EXTERIORIZATION   
                                2013        

 

             47.19                                  OTH INCIDENT APPENDECTOMY  
                                 2013        

 

             48.63                                  ANTERIOR RECT RESECT NEC   
                                2013        

 

             99.15                                  PARENTERAL INFUSION OF 
CONCENTRATED NUT.                                   2013        

 

             57.32                                  CYSTOSCOPY NEC             
                      2013        



                          



Results

      





 Test            Result            Range        









 Semen free prostate specific antigen (PSA) measurement (units/volume) -  09:07         









 Prostate specific ag [mass/volume] in serum or plasma            0.44 ng/mL   
         0.00-4.00        









 Semen free prostate specific antigen (PSA) measurement (units/volume) -  09:07         









 Prostate specific ag [mass/volume] in serum or plasma            0.44         
    NRG        









 Whole blood basic metabolic panel - 17 11:06         









 Serum or plasma sodium measurement (moles/volume)            137 mmol/L       
     135-145        

 

 Serum or plasma potassium measurement (moles/volume)            4.4 mmol/L    
        3.6-5.0        

 

 Serum or plasma chloride measurement (moles/volume)            107 mmol/L     
               

 

 Carbon dioxide            18 mmol/L            21-32        

 

 Serum or plasma anion gap determination (moles/volume)            12 mmol/L   
         5-14        

 

 Serum or plasma urea nitrogen measurement (mass/volume)            14 mg/dL   
         7-18        

 

 Serum or plasma creatinine measurement (mass/volume)            1.02 mg/dL    
        0.60-1.30        

 

 Serum or plasma urea nitrogen/creatinine mass ratio            14             
NRG        

 

 Serum or plasma creatinine measurement with calculation of estimated 
glomerular filtration rate            >             NRG        

 

 Serum or plasma glucose measurement (mass/volume)            94 mg/dL         
           

 

 Serum or plasma calcium measurement (mass/volume)            9.2 mg/dL        
    8.5-10.1        









 Lipid 1996 panel - 17 11:06         









 Serum or plasma triglyceride measurement (mass/volume)            456 mg/dL   
         <150        

 

 Serum or plasma cholesterol measurement (mass/volume)            165 mg/dL    
        < 200        

 

 Serum or plasma cholesterol in HDL measurement (mass/volume)            28 mg/
dL            40-60        

 

 Cholesterol in LDL [mass/volume] in serum or plasma by direct assay            
43 mg/dL            1-129        

 

 Serum or plasma cholesterol in VLDL measurement (mass/volume)            91 mg/
dL            5-40        









 Hemoglobin A1c - 17 11:06         









 Hemoglobin A1c            5.4 %            4.5-6.2        









 Lipid 1996 panel - 17 10:37         









 Serum or plasma triglyceride measurement (mass/volume)            256 mg/dL   
         <150        

 

 Serum or plasma cholesterol measurement (mass/volume)            146 mg/dL    
        < 200        

 

 Serum or plasma cholesterol in HDL measurement (mass/volume)            32 mg/
dL            40-60        

 

 Cholesterol in LDL [mass/volume] in serum or plasma by direct assay            
60 mg/dL            1-129        

 

 Serum or plasma cholesterol in VLDL measurement (mass/volume)            51 mg/
dL            5-40        









 Semen free prostate specific antigen (PSA) measurement (units/volume) -  14:06         









 Prostate specific ag [mass/volume] in serum or plasma            0.36 %       
     0.00-4.00        



                            



Encounters

      





 ACCT No.            Visit Date/Time            Discharge            Status    
        Pt. Type            Provider            Facility            Loc./Unit  
          Complaint        

 

 [id number]            2018 10:30:00            2018 23:59:59    
        CLS            Preadmit            ELIZA BROWN MD            [facility] 
[facility]            RECTAL CANCER        

 

 [id number]            2018 12:00:00            2018 13:00:00    
        DIS            Outpatient            ELIZA BROWN MD            
[facility]            PREOP            RECTAL CANCER     
   

 

 [id number]            07/10/2018 13:41:00            07/10/2018 23:59:59    
        CLS            Outpatient            RUFUS URIARTE            [facility] 
[facility]            ONC                     

 

 [id number]            2018 10:27:00            07/10/2018 11:14:00    
        DIS            Outpatient            MECCA MARTÍNEZ MD            [facility] 
[facility]            ONC                     

 

 [id number]            2018 10:19:00            2018 16:16:00    
        DIS            Outpatient            RUFUS URIARTE            [facility] 
[facility]            ONC                     

 

 [id number]            2018 10:05:00            2018 00:01:00    
        DIS            Outpatient            MECCA MARTÍNEZ MD            [facility] 
[facility]            ONC                     

 

 [id number]            2018 10:29:00            2018 23:59:59    
        CLS            Outpatient            NAT INIGUEZ DO          
  [facility]            AGE RELATED 
OSTEOPOROSIS        

 

 [id number]            2018 13:48:00            2018 23:59:59    
        CLS            Outpatient            SCHOENFELD DO, ROGER H            
[facility]            LAB            Z12.5        

 

 [id number]            2017 09:47:00            2017 23:59:59    
        CLS            Outpatient            MECCA MARTÍNEZ MD            [facility] 
[facility]            LAB                     

 

 [id number]            10/17/2017 10:23:00            2017 09:41:00    
        DIS            Outpatient            MARK PRADHAN MD            [facility] 
[facility]            ONC                     

 

 [id number]            2017 16:32:00            2017 23:59:59    
        CLS            Preadmit            MECCA MARTÍNEZ MD            [facility] 
[facility]            ONC                     

 

 [id number]            2017 10:47:00            2017 00:01:00    
        DIS            Outpatient            MARK PRADHAN MD            [facility] 
[facility]            ONC                     

 

 [id number]            2017 11:07:00            2017 00:01:00    
        DIS            Outpatient            ELANA YI MD            [facility] 
[facility]            ONC                     

 

 [id number]            2017 10:36:00            2017 23:59:59    
        CLS            Outpatient            NAT INIGUEZ DO          
  [facility]            RAD            HEIGHT LOSS,
OSTEOPENIA        

 

 [id number]            2017 10:21:00            2017 00:01:00    
        DIS            Outpatient            ELANA YI MD            [facility] 
[facility]            ONC                     

 

 [id number]            2016 10:39:00            2017 00:01:00    
        DIS            Outpatient            ELANA YI MD            [facility] 
[facility]            ONC                     

 

 [id number]            2016 12:08:00            2016 23:59:59    
        CLS            Outpatient            NAT INIGUEZ DO          
  [facility]            RAD            SUDDEN VISION LOSS 
LEFT EYE        

 

 [id number]            2016 16:21:00            2016 23:59:59    
        CLS            Preadmit            SCHOENFELD DO, ROGER H            
[facility]            LAB                     

 

 [id number]            2016 09:08:00            2016 23:59:59    
        CLS            Outpatient            SCHOENFELD DO, ROGER H            
[facility]            LAB                     

 

 [id number]            2016 08:45:00            2016 23:59:59    
        CLS            Outpatient            ELANA YI MD            [facility] 
[facility]            RAD            RECTAL CANCER        

 

 [id number]            2016 10:35:00            2016 00:01:00    
        DIS            Outpatient            ELANA YI MD            [facility] 
[facility]            ONC                     

 

 [id number]            2016 10:37:00            2016 23:59:59    
        CLS            Outpatient            VIANEYER NAT GUILLEN          
  [facility]            LAB                     

 

 [id number]            2016 09:38:00            2016 00:01:00    
        DIS            Outpatient            ELANA YI MD            [facility] 
[facility]            ONC                     

 

 [id number]            2016 10:38:00            2016 00:01:00    
        DIS            Outpatient            ELANA YI MD            [facility] 
[facility]            ONC                     

 

 [id number]            2016 10:40:00            2016 23:59:59    
        CLS            Outpatient            NAT INIGUEZ DO          
  [facility]            LAB                     

 

 [id number]            2015 10:27:00            2015 23:59:59    
        CLS            Outpatient            SCHOENFELD  MAURA JESSICA            
[facility]            LAB                     

 

 [id number]            10/27/2015 10:29:00            10/27/2015 23:59:59    
        CLS            Outpatient            ELANA YI MD            [facility] 
[facility]            RAD            RECTAL CANCER        

 

 [id number]            2015 10:36:00            2015 00:01:00    
        DIS            Outpatient            ELANA YI MD            [facility] 
[facility]            ONC                     

 

 [id number]            2015 10:51:00            2015 00:01:00    
        DIS            Outpatient            ELANA YI MD            [facility] 
[facility]            ONC                     

 

 [id number]            2015 11:32:00            2015 23:59:59    
        CLS            Outpatient            JYOTSNA CARABALLO          
  [facility]            QUICK                     

 

 [id number]            2015 09:15:00            2015 00:01:00    
        DIS            Outpatient            ELANA YI MD            [facility] 
[facility]            ONC                     

 

 [id number]            2014 08:43:00            2014 23:59:59    
        CLS            Outpatient            GREGG BROWN DO            [facility]ia 
[facility]            LAB            OUTSIDE LAB        

 

 [id number]            2014 10:18:00            2014 00:01:00    
        DIS            Outpatient            ELANA YI MD            [facility] 
[facility]            ONC                     

 

 [id number]            10/01/2014 08:04:00            10/01/2014 23:59:59    
        CLS            Outpatient            ELANA YI MD            [facility] 
[facility]            RAD            RECTAL CA        

 

 [id number]            2014 10:40:00            2014 09:33:00    
        DIS            Outpatient            ELANA YI MD            [facility] 
[facility]            ONC                     

 

 [id number]            2014 12:55:00            2014 23:59:59    
        CLS            Outpatient            NAT INIGUEZ DO          
  [facility]            LAB                     

 

 [id number]            2014 12:43:00            2014 00:01:00    
        DIS            Outpatient            ELANA YI MD            [facility] 
[facility]            ONC                     

 

 [id number]            2013 13:44:00            2014 00:01:00    
        DIS            Outpatient            ELANA YI MD            [facility] 
[facility]            ONC                     

 

 [id number]            2014 06:20:00            2014 11:00:00    
        DIS            Outpatient            ELIZA BROWN MD            
[facility]            SDC            RECTAL CANCER        

 

 [id number]            2014 08:18:00            2014 23:59:59    
        CLS            Outpatient            ELIZA BROWN MD            
[facility]            PREOP            RECTAL CANCER     
   

 

 [id number]            10/15/2013 08:43:00            2013 00:01:00    
        DIS            Outpatient            ELANA YI MD            [facility] 
[facility]            ONC                     

 

 [id number]            10/09/2013 09:18:00            10/09/2013 23:59:59    
        CLS            Outpatient            SCHOENFELD DO, ROGER H            
[facility]            RAD            URINARY RETENTION   
     

 

 [id number]            2013 11:22:00            2013 00:01:00    
        DIS            Outpatient            ELANA YI MD            [facility] 
[facility]            ONC                     

 

 [id number]            2013 11:37:00            2013 23:59:59    
        CLS            Outpatient            DHIRAJ GUILLENNAT S          
  [facility]            LAB                     

 

 [id number]            2013 15:40:00            2013 15:35:00    
        DIS            Inpatient            VAMSHINDAILYN KAYE GUILLENLINE S            
[facility]            4TH            ACUTE RENAL FAILURE ,
VOLUME DEPLETION        

 

 [id number]            2013 15:01:00            2013 23:59:59    
        CLS            Outpatient            RAFY RAMAN ARNP            
[facility]            ONC                     

 

 [id number]            2013 06:02:00            05/10/2013 19:30:00    
        DIS            Inpatient            STEPHANIE HERNANDEZ, ELIZA REED            [facility]ia 
[facility]            SURGICAL            CANCER        

 

 [id number]            2013 10:11:00                                   
   Document Registration                                                       
     

 

 [id number]            2013 09:56:00                                   
   Document Registration                                                       
     

 

 [id number]            2013 09:53:00                                   
   Document Registration                                                       
     

 

 [id number]            2013 08:53:00                                   
   Document Registration                                                       
     

 

 [id number]            2013 06:55:00                                   
   Document Registration                                                       
     

 

 [id number]            01/15/2013 10:04:00                                   
   Document Registration                                                       
     

 

 [id number]            01/10/2013 10:27:00                                   
   Document Registration                                                       
     

 

 [id number]            2012 05:31:00                                   
   Document Registration                                                       
     

 

 [id number]            2012 07:35:00                                   
   Document Registration                                                       
     

 

 [id number]            2012 09:28:00                                   
   Document Registration                                                       
     

 

 [id number]            2012 09:23:00                                   
   Document Registration                                                       
     

 

 [id number]            2012 12:55:00                                   
   Document Registration                                                       
     

 

 [id number]            2012 13:43:00                                   
   Document Registration                                                       
     

 

 [id number]            2011 06:51:00                                   
   Document Registration                                                       
     

 

 [id number]            2018 10:00:00            2018 23:59:59          
  CLS            Outpatient            VERN MONAHAN LAC                     

 

 2018 11:08:50            2018 23:59:59         
   CLS            Outpatient            Nat Iniguez                 
                              

 

 [id number]            2015 10:36:56                         ACT            
Document Registration

## 2018-08-17 NOTE — XMS REPORT
[facility]

 Created on: 2018



Dominic Jaquez

External Reference #: [id number]

: 1955

Sex: Male



Demographics







 Address  [address] [place]  [address]

 

 Preferred Language  Unknown

 

 Marital Status  Unknown

 

 [employer] Affiliation  Unknown

 

 Race  Unknown

 

 Ethnic Group  Unknown





Author







 Author  JARET SHORT

 

 Organization  [facility]

 

 Address  [address] [place]  [address]



 

 Phone  [phone]







Care Team Providers







 Care Team Member Name  Role  Phone

 

 JARET SHORT  Unavailable  [phone]







PROBLEMS

Unknown Problems



ALLERGIES

No Information



ENCOUNTERS







 Encounter  Location  Date  Diagnosis

 

 [facility]  [address] [facility] [id number] [id number]  
07 Aug, 2018   

 

 [facility]  [address] [facility] [id number] [place], [place] [id number]  02 May, 2018   

 

 [place] [id number] [facility] [id number] [id number]  
01 May, 2018  Dental examination Z01.20







IMMUNIZATIONS

No Known Immunizations



SOCIAL HISTORY

Never Assessed



REASON FOR VISIT

FYI only



PLAN OF CARE





VITAL SIGNS





MEDICATIONS

Unknown Medications



RESULTS

No Results



PROCEDURES

No Known procedures



INSTRUCTIONS





MEDICATIONS ADMINISTERED

No Known Medications



MEDICAL (GENERAL) HISTORY







 Type  Description  Date

 

 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   

 

 Medical History  Cancer  colorectal   

 

 Medical History  Had radiation and chemotherapy with colorectal cancer   

 

 Medical History  Patient has intermittent catheter 4-6x daily due to uremic 
posioniong for the rest of his life   

 

 Surgical History  Colorectal cancer remove tumor  13

 

 Hospitalization History  Hospitalized for colorectal tumor and uremic 
posioning

## 2018-08-17 NOTE — HISTORY & PHYSICIAL
History of Present Illness


History of Present Illness


Reason for visit/HPI


To undergo removal of an Zmzmmn-m-Fluk, having completed adjuvant chemotherapy 

for rectal carcinoma


Date of Admission


8/17/18


Date Seen by Provider:  Aug 17, 2018


Time Seen by Provider:  09:28


I consulted on this patient on


8/17/18


 09:28


Attending Physician


Eliza Whitt MD


Admitting Physician


Nat Iniguez DO


Consult








Allergies and Home Medications


Allergies


Coded Allergies:  


     Penicillins (Unverified  Allergy, Unknown, 7/20/18)





Home Medications


Aspirin 325 Mg Tablet, 325 MG PO DAILY, (Reported)


Multivitamin 1 Each Tablet, 1 EACH PO DAILY, (Reported)


Omega 3 Polyunsat Fatty Acids 1,000 Mg Cap, 1,000 MG PO TID, (Reported)





Patient Home Medication List


Home Medication List Reviewed:  Yes





Past Medical-Social-Family Hx


Patient Social History


Marrital Status:  


Employed/Student:  retired


Type Used:  Cigarettes


Recent Foreign Travel:  No


Contact w/other who traveled:  No


Recent Hopitalizations:  No





Immunizations Up To Date


Tetanus Booster (TDap):  More than 5yrs


Date of Pneumonia Vaccine:  Sep 11, 2017


Date of Influenza Vaccine:  Oct 16, 2017





Seasonal Allergies


Seasonal Allergies:  No





Surgeries


Yes


Bowel Surgery





Respiratory


No





Cardiovascular


Yes


Hypertension





Neurological


No


Stroke





Reproductive System


Hx Reproductive Disorders:  No


Sexually Transmitted Disease:  No


HIV/AIDS:  No





Genitourinary


Renal Failure





Gastrointestinal


No





Musculoskeletal


Yes


Arthritis





HEENT


Loss of Vision:  Bilateral


Hearing Impairment:  Denies





Cancer


Yes


Rectal


Did You Recieve Any Treatments:  Yes


Type of Treatment:  Chemotherapy, Radiation, Surgical Intervention





Blood Transfusions


Adverse Reaction to a Blood Tr:  No (N/A)





Constitutional:  no symptoms reported


EENTM:  no symptoms reported


Respiratory:  no symptoms reported


Cardiovascular:  no symptoms reported


Genitourinary:  no symptoms reported


Musculoskeletal:  no symptoms reported


Skin:  no symptoms reported


Psychiatric/Neurological:  No Symptoms Reported





Physical Exam


Vital Signs


Capillary Refill :


Height, Weight, BMI


Height: 5'7.00"


Weight: 173lbs. 2.0oz. 78.033268tq; 27.1 BMI


Method:


General Appearance:  No Apparent Distress


Neck:  Normal Inspection


Respiratory:  Lungs Clear


Cardiovascular:  Regular Rate, Rhythm


Gastrointestinal:  Non Tender, Soft


Extremity:  Normal Inspection


Neurologic/Psychiatric:  Alert, Oriented x3





Assessment/Plan


Assessment and Plan


Gentleman with resected carcinoma of the rectum.  Anterior resection with loop 

ileostomy.  4 removal of Txfqlc-q-Sehr





Admission Diagnosis


Admission Status:  Other (Same Day Surgery)











ELIZA WHITT MD Aug 17, 2018 9:30 am

## 2018-08-17 NOTE — DISCHARGE INST-SIMPLE/STANDARD
Discharge Inst-Standard


Discharge Medications


New, Converted or Re-Newed RX:  RX on Chart





Patient Instructions/Follow Up


Plan of Care/Instructions/FU:  


Band-Aids off in 48 hours.


Activity as Tolerated:  Yes


Discharge Diet:  No Restrictions











ELIZA BROWN MD Aug 17, 2018 10:43 am

## 2018-08-17 NOTE — XMS REPORT
[facility]

 Created on: 2018



Dominic Jaquez

External Reference #: [id number]

: 1955

Sex: Male



Demographics







 Address  [address] [place]  [address]

 

 Preferred Language  Unknown

 

 Marital Status  Unknown

 

 Denominational Affiliation  Unknown

 

 Race  Unknown

 

 Ethnic Group  Unknown





Author







 Author  KANDI  JUANCARLOS

 

 Organization  [facility]

 

 Address  [address] [place]  [address]



 

 Phone  [phone]







Care Team Providers







 Care Team Member Name  Role  Phone

 

 JUANCARLOS CHAU  Unavailable  [phone]







PROBLEMS

Unknown Problems



ALLERGIES







 Substance  Reaction  Event Type  Date  Status

 

 Penicillamine  Unknown  Drug Allergy  01 May, 2018  Active







ENCOUNTERS







 Encounter  Location  Date  Diagnosis

 

 [facility]  [address] [facility] [id number] [id number]  
04 Sep, 2018   

 

 [facility]  [address] [facility] [id number] [id number]  
07 Aug, 2018  Dental examination Z01.20

 

 [facility]  [address] [facility] [id number] [place] [address]  02 May, 2018   

 

 [facility] [place] [id number] [id number]  
01 May, 2018  Dental examination Z01.20







IMMUNIZATIONS

No Known Immunizations



SOCIAL HISTORY

Never Assessed



REASON FOR VISIT

ROBERTO w hygiene



PLAN OF CARE







 Activity  Details









  









 Follow Up  1hr restorative Reason:







VITAL SIGNS







 Blood pressure systolic  130 mmHg  2018

 

 Blood pressure diastolic  86 mmHg  2018







MEDICATIONS







 Medication  Instructions  Dosage  Frequency  Start Date  End Date  Duration  
Status

 

 Hydrocodone-Acetaminophen                    Active

 

 Mens One Daily                    Active

 

 Aspirin EC Extra Strength                    Active

 

 Temazepam                    Active







RESULTS

No Results



PROCEDURES







 Procedure  Date Ordered  Result  Body Site

 

 COMP ORAL EVALUATION - NEW/EST PT  May 01, 2018      

 

 INTRAORL - CMPL SERIES CODE 11077  May 01, 2018      

 

 TOPICAL FLUORIDE VARNISH  May 01, 2018      

 

 PROPHYLAXIS - ADULT  May 01, 2018      







INSTRUCTIONS





MEDICATIONS ADMINISTERED

No Known Medications



MEDICAL (GENERAL) HISTORY







 Type  Description  Date

 

 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   

 

 Medical History  Cancer  colorectal   

 

 Medical History  Had radiation and chemotherapy with colorectal cancer   

 

 Medical History  Patient has intermittent catheter 4-6x daily due to uremic 
posioniong for the rest of his life   

 

 Surgical History  Colorectal cancer remove tumor  13

 

 Hospitalization History  Hospitalized for colorectal tumor and uremic 
posioning

## 2018-08-17 NOTE — PROGRESS NOTE-PRE OPERATIVE
Pre-Operative Progress Note


H&P Reviewed


The H&P was reviewed, patient examined and no changes noted.


Date Seen by Provider:  Aug 17, 2018


Time Seen by Provider:  09:30


Date H&P Reviewed:  Aug 17, 2018


Time H&P Reviewed:  09:30


Pre-Operative Diagnosis:   Resected rectal carcinoma











ELIZA BROWN MD Aug 17, 2018 9:31 am

## 2018-08-17 NOTE — ANESTHESIA-GENERAL POST-OP
MAC


Patient Condition


Mental Status/LOC:  Same as Preop


Cardiovascular:  Satisfactory


Nausea/Vomiting:  Absent


Respiratory:  Satisfactory


Pain:  Controlled


Complications:  Absent





Post Op Complications


Complications


None





Follow Up Care/Instructions


Patient Instructions


None needed.





Anesthesiology Discharge Order


Discharge Order


Patient is doing well, no complaints, stable vital signs, no apparent adverse 

anesthesia problems.   


No complications reported per nursing.











BRIANNA CONNELL CRNA Aug 17, 2018 14:26

## 2018-12-21 ENCOUNTER — HOSPITAL ENCOUNTER (OUTPATIENT)
Dept: HOSPITAL 75 - LAB | Age: 63
End: 2018-12-21
Attending: FAMILY MEDICINE
Payer: MEDICARE

## 2018-12-21 DIAGNOSIS — Z85.048: ICD-10-CM

## 2018-12-21 DIAGNOSIS — R53.83: ICD-10-CM

## 2018-12-21 DIAGNOSIS — N28.9: ICD-10-CM

## 2018-12-21 DIAGNOSIS — I10: Primary | ICD-10-CM

## 2018-12-21 LAB
ALBUMIN SERPL-MCNC: 4.2 GM/DL (ref 3.2–4.5)
ALP SERPL-CCNC: 88 U/L (ref 40–136)
ALT SERPL-CCNC: 23 U/L (ref 0–55)
BASOPHILS # BLD AUTO: 0.1 10^3/UL (ref 0–0.1)
BASOPHILS NFR BLD AUTO: 1 % (ref 0–10)
BILIRUB SERPL-MCNC: 0.9 MG/DL (ref 0.1–1)
BUN/CREAT SERPL: 18
CALCIUM SERPL-MCNC: 9.4 MG/DL (ref 8.5–10.1)
CHLORIDE SERPL-SCNC: 109 MMOL/L (ref 98–107)
CHOLEST SERPL-MCNC: 166 MG/DL (ref ?–200)
CO2 SERPL-SCNC: 14 MMOL/L (ref 21–32)
CREAT SERPL-MCNC: 1.04 MG/DL (ref 0.6–1.3)
EOSINOPHIL # BLD AUTO: 0.2 10^3/UL (ref 0–0.3)
EOSINOPHIL NFR BLD AUTO: 2 % (ref 0–10)
ERYTHROCYTE [DISTWIDTH] IN BLOOD BY AUTOMATED COUNT: 14.4 % (ref 10–14.5)
GFR SERPLBLD BASED ON 1.73 SQ M-ARVRAT: > 60 ML/MIN
GLUCOSE SERPL-MCNC: 94 MG/DL (ref 70–105)
HCT VFR BLD CALC: 49 % (ref 40–54)
HDLC SERPL-MCNC: 31 MG/DL (ref 40–60)
HGB BLD-MCNC: 17 G/DL (ref 13.3–17.7)
LYMPHOCYTES # BLD AUTO: 1.7 X 10^3 (ref 1–4)
LYMPHOCYTES NFR BLD AUTO: 18 % (ref 12–44)
MANUAL DIFFERENTIAL PERFORMED BLD QL: NO
MCH RBC QN AUTO: 32 PG (ref 25–34)
MCHC RBC AUTO-ENTMCNC: 34 G/DL (ref 32–36)
MCV RBC AUTO: 93 FL (ref 80–99)
MONOCYTES # BLD AUTO: 0.9 X 10^3 (ref 0–1)
MONOCYTES NFR BLD AUTO: 10 % (ref 0–12)
NEUTROPHILS # BLD AUTO: 6.2 X 10^3 (ref 1.8–7.8)
NEUTROPHILS NFR BLD AUTO: 69 % (ref 42–75)
PLATELET # BLD: 202 10^3/UL (ref 130–400)
PMV BLD AUTO: 12.6 FL (ref 7.4–10.4)
POTASSIUM SERPL-SCNC: 4.5 MMOL/L (ref 3.6–5)
PROT SERPL-MCNC: 7.1 GM/DL (ref 6.4–8.2)
RBC # BLD AUTO: 5.31 10^6/UL (ref 4.35–5.85)
SODIUM SERPL-SCNC: 135 MMOL/L (ref 135–145)
TRIGL SERPL-MCNC: 323 MG/DL (ref ?–150)
VLDLC SERPL CALC-MCNC: 65 MG/DL (ref 5–40)
WBC # BLD AUTO: 9 10^3/UL (ref 4.3–11)

## 2018-12-21 PROCEDURE — 80053 COMPREHEN METABOLIC PANEL: CPT

## 2018-12-21 PROCEDURE — 84443 ASSAY THYROID STIM HORMONE: CPT

## 2018-12-21 PROCEDURE — 83036 HEMOGLOBIN GLYCOSYLATED A1C: CPT

## 2018-12-21 PROCEDURE — 80061 LIPID PANEL: CPT

## 2018-12-21 PROCEDURE — 36415 COLL VENOUS BLD VENIPUNCTURE: CPT

## 2018-12-21 PROCEDURE — 82378 CARCINOEMBRYONIC ANTIGEN: CPT

## 2018-12-21 PROCEDURE — 85025 COMPLETE CBC W/AUTO DIFF WBC: CPT

## 2019-06-24 ENCOUNTER — HOSPITAL ENCOUNTER (OUTPATIENT)
Dept: HOSPITAL 75 - LAB | Age: 64
End: 2019-06-24
Attending: FAMILY MEDICINE
Payer: MEDICARE

## 2019-06-24 DIAGNOSIS — N40.0: ICD-10-CM

## 2019-06-24 DIAGNOSIS — E78.2: Primary | ICD-10-CM

## 2019-06-24 DIAGNOSIS — I10: ICD-10-CM

## 2019-06-24 LAB
ALBUMIN SERPL-MCNC: 4 GM/DL (ref 3.2–4.5)
ALP SERPL-CCNC: 79 U/L (ref 40–136)
ALT SERPL-CCNC: 18 U/L (ref 0–55)
BASOPHILS # BLD AUTO: 0 10^3/UL (ref 0–0.1)
BASOPHILS NFR BLD AUTO: 1 % (ref 0–10)
BILIRUB SERPL-MCNC: 1.4 MG/DL (ref 0.1–1)
BUN/CREAT SERPL: 14
CALCIUM SERPL-MCNC: 9.5 MG/DL (ref 8.5–10.1)
CHLORIDE SERPL-SCNC: 106 MMOL/L (ref 98–107)
CHOLEST SERPL-MCNC: 192 MG/DL (ref ?–200)
CO2 SERPL-SCNC: 20 MMOL/L (ref 21–32)
CREAT SERPL-MCNC: 1.09 MG/DL (ref 0.6–1.3)
EOSINOPHIL # BLD AUTO: 0.1 10^3/UL (ref 0–0.3)
EOSINOPHIL NFR BLD AUTO: 2 % (ref 0–10)
ERYTHROCYTE [DISTWIDTH] IN BLOOD BY AUTOMATED COUNT: 14.3 % (ref 10–14.5)
GFR SERPLBLD BASED ON 1.73 SQ M-ARVRAT: > 60 ML/MIN
GLUCOSE SERPL-MCNC: 93 MG/DL (ref 70–105)
HCT VFR BLD CALC: 49 % (ref 40–54)
HDLC SERPL-MCNC: 31 MG/DL (ref 40–60)
HGB BLD-MCNC: 17.1 G/DL (ref 13.3–17.7)
LYMPHOCYTES # BLD AUTO: 1.4 X 10^3 (ref 1–4)
LYMPHOCYTES NFR BLD AUTO: 19 % (ref 12–44)
MANUAL DIFFERENTIAL PERFORMED BLD QL: NO
MCH RBC QN AUTO: 32 PG (ref 25–34)
MCHC RBC AUTO-ENTMCNC: 35 G/DL (ref 32–36)
MCV RBC AUTO: 91 FL (ref 80–99)
MONOCYTES # BLD AUTO: 0.9 X 10^3 (ref 0–1)
MONOCYTES NFR BLD AUTO: 13 % (ref 0–12)
NEUTROPHILS # BLD AUTO: 4.9 X 10^3 (ref 1.8–7.8)
NEUTROPHILS NFR BLD AUTO: 66 % (ref 42–75)
PLATELET # BLD: 209 10^3/UL (ref 130–400)
PMV BLD AUTO: 12.4 FL (ref 7.4–10.4)
POTASSIUM SERPL-SCNC: 4.5 MMOL/L (ref 3.6–5)
PROT SERPL-MCNC: 6.7 GM/DL (ref 6.4–8.2)
SODIUM SERPL-SCNC: 137 MMOL/L (ref 135–145)
TRIGL SERPL-MCNC: 299 MG/DL (ref ?–150)
VLDLC SERPL CALC-MCNC: 60 MG/DL (ref 5–40)
WBC # BLD AUTO: 7.4 10^3/UL (ref 4.3–11)

## 2019-06-24 PROCEDURE — 80053 COMPREHEN METABOLIC PANEL: CPT

## 2019-06-24 PROCEDURE — 84153 ASSAY OF PSA TOTAL: CPT

## 2019-06-24 PROCEDURE — 80061 LIPID PANEL: CPT

## 2019-06-24 PROCEDURE — 36415 COLL VENOUS BLD VENIPUNCTURE: CPT

## 2019-06-24 PROCEDURE — 85025 COMPLETE CBC W/AUTO DIFF WBC: CPT

## 2019-07-22 ENCOUNTER — HOSPITAL ENCOUNTER (OUTPATIENT)
Dept: HOSPITAL 75 - PREOP | Age: 64
Discharge: HOME | End: 2019-07-22
Attending: SURGERY
Payer: MEDICARE

## 2019-07-22 VITALS — BODY MASS INDEX: 32.21 KG/M2 | HEIGHT: 67 IN | WEIGHT: 205.25 LBS

## 2019-07-22 DIAGNOSIS — Z01.818: Primary | ICD-10-CM

## 2019-07-24 ENCOUNTER — HOSPITAL ENCOUNTER (OUTPATIENT)
Dept: HOSPITAL 75 - ENDO | Age: 64
Discharge: HOME | End: 2019-07-24
Attending: SURGERY
Payer: MEDICARE

## 2019-07-24 VITALS — DIASTOLIC BLOOD PRESSURE: 66 MMHG | SYSTOLIC BLOOD PRESSURE: 129 MMHG

## 2019-07-24 VITALS — DIASTOLIC BLOOD PRESSURE: 55 MMHG | SYSTOLIC BLOOD PRESSURE: 100 MMHG

## 2019-07-24 VITALS — SYSTOLIC BLOOD PRESSURE: 92 MMHG | DIASTOLIC BLOOD PRESSURE: 51 MMHG

## 2019-07-24 VITALS — SYSTOLIC BLOOD PRESSURE: 125 MMHG | DIASTOLIC BLOOD PRESSURE: 55 MMHG

## 2019-07-24 VITALS — SYSTOLIC BLOOD PRESSURE: 114 MMHG | DIASTOLIC BLOOD PRESSURE: 55 MMHG

## 2019-07-24 VITALS — DIASTOLIC BLOOD PRESSURE: 73 MMHG | SYSTOLIC BLOOD PRESSURE: 135 MMHG

## 2019-07-24 VITALS — DIASTOLIC BLOOD PRESSURE: 58 MMHG | SYSTOLIC BLOOD PRESSURE: 122 MMHG

## 2019-07-24 VITALS — WEIGHT: 205.25 LBS | BODY MASS INDEX: 32.21 KG/M2 | HEIGHT: 67 IN

## 2019-07-24 VITALS — SYSTOLIC BLOOD PRESSURE: 116 MMHG | DIASTOLIC BLOOD PRESSURE: 69 MMHG

## 2019-07-24 VITALS — DIASTOLIC BLOOD PRESSURE: 72 MMHG | SYSTOLIC BLOOD PRESSURE: 110 MMHG

## 2019-07-24 VITALS — DIASTOLIC BLOOD PRESSURE: 74 MMHG | SYSTOLIC BLOOD PRESSURE: 132 MMHG

## 2019-07-24 VITALS — SYSTOLIC BLOOD PRESSURE: 133 MMHG | DIASTOLIC BLOOD PRESSURE: 59 MMHG

## 2019-07-24 VITALS — SYSTOLIC BLOOD PRESSURE: 110 MMHG | DIASTOLIC BLOOD PRESSURE: 52 MMHG

## 2019-07-24 VITALS — DIASTOLIC BLOOD PRESSURE: 75 MMHG | SYSTOLIC BLOOD PRESSURE: 113 MMHG

## 2019-07-24 VITALS — DIASTOLIC BLOOD PRESSURE: 59 MMHG | SYSTOLIC BLOOD PRESSURE: 115 MMHG

## 2019-07-24 VITALS — DIASTOLIC BLOOD PRESSURE: 58 MMHG | SYSTOLIC BLOOD PRESSURE: 109 MMHG

## 2019-07-24 DIAGNOSIS — Z85.048: ICD-10-CM

## 2019-07-24 DIAGNOSIS — K22.2: ICD-10-CM

## 2019-07-24 DIAGNOSIS — K44.9: ICD-10-CM

## 2019-07-24 DIAGNOSIS — K21.0: ICD-10-CM

## 2019-07-24 DIAGNOSIS — Z88.0: ICD-10-CM

## 2019-07-24 DIAGNOSIS — Z87.891: ICD-10-CM

## 2019-07-24 DIAGNOSIS — Z82.49: ICD-10-CM

## 2019-07-24 DIAGNOSIS — Z79.82: ICD-10-CM

## 2019-07-24 DIAGNOSIS — K29.70: ICD-10-CM

## 2019-07-24 DIAGNOSIS — C16.0: Primary | ICD-10-CM

## 2019-07-24 DIAGNOSIS — Z83.3: ICD-10-CM

## 2019-07-24 DIAGNOSIS — Z79.891: ICD-10-CM

## 2019-07-24 NOTE — DISCHARGE INST-SURGICAL
D/C Lap Instructions-WADE


Follow Up 





Activity as tolerated








High Fiber Diet 25g or more per day





Avoid Alcohol, Caffeine, Spicy Greasy and Acid foods.





Drink 64 fluid oz or more of fluids per day.





Symptoms to Report: Fever over 101 degree F, Nausea/Vomiting 


If any problems/questions: Contact your physician or go to Emergency Room











JACKY OLVERA MD                Jul 24, 2019 13:37

## 2019-07-24 NOTE — OPERATIVE REPORT
DATE OF SERVICE:  07/24/2019



ATTENDING PRIMARY CARE PHYSICIAN:

Dr. Iniguez.



PREOPERATIVE DIAGNOSES:

Dysphagia, gastroesophageal reflux disease.



POSTOPERATIVE DIAGNOSES:

Reflux esophagitis, stage IV with esophageal stricture, moderate to large size

hiatal hernia 4 cm in size, mild gastritis, normal duodenum.



PROCEDURE:

EGD with biopsy and balloon dilatation.



SURGEON:

Jacky Olvera MD



ANESTHESIA:

Conscious sedation.



ESTIMATED BLOOD LOSS:

Minimal.



FINDINGS:

Reflux esophagitis, stage IV with esophageal stricture, moderate to large size

hiatal hernia 4 cm in size, mild gastritis, normal duodenum.



DISPOSITION:

The patient tolerated the procedure well.



INDICATIONS:

The patient is a 64-year-old male with a three-month history of dysphagia and

states that when eating some foods, he will feel substernal pressure sensation

and would then feel the sensation of choking and then cough and then would

regurgitate his food.  He does have risk factors including gastroesophageal

reflux disease, smoking as well as history of caffeine use and alcohol in the

past.



DESCRIPTION OF PROCEDURE:

The patient was brought to the endoscopy suite, laid in the left lateral

decubitus position with the head slightly elevated.  After adequate IV pain and

sedating medications and conscious sedation anesthesia, the mouthpiece was

applied.



The endoscope was placed in the mouth, visualizing the pharynx and

hypopharyngeal region.  Vocal cords, epiglottis and vallecula identified and

appeared to be normal.  The endoscope was then gently intubated in the

esophageal opening and esophagus insufflated.  The endoscope was then advanced

through the first, second and third portion of the esophagus at the level of the

GE junction, a significant reflux esophagitis stage IV identified.  There was

also distal esophageal stricture.  Multiple biopsies were taken of the GE

junction with forceps with visualization of good hemostasis.



The endoscope was able to pass through the GE junction.  The endoscope

retroflexed, visualizing a moderate to large size hiatal hernia approximately 4

cm in size.  A mild gastritis was noted.  There were no formal ulcerations,

polyps, or any neoplasms.  A biopsy was taken of the antrum to rule out H.

pylori with visualization of good hemostasis.  The endoscope was then advanced

to the pylorus and the first and second portion of the duodenum, which appeared

normal and no distal obstructions.  The endoscope was then slowly withdrawn.  We

then proceeded with dilatation of the stricture.  The balloon was placed in the

stomach and pulled back to the area of the stricture.  We first proceeded to 2

atmospheres of pressure or 12 mm in diameter with mild resistance.  We then

proceeded to 4 atmospheres of pressure or 13 mm with moderate resistance and

left this in place for approximately 60 seconds.  The balloon was then

desufflated and removed.  No mucosal tears were identified as well as no

bleeding.  The endoscope was then slowly withdrawn while taking a second look

and suctioning residual air with no additional findings.



The patient tolerated the procedure well.  We are unsure of the tissue diagnosis

at this time; however, there was a significant reflux esophagitis and we will

await the biopsy results.  We were able to proceed with gentle dilatation in

hopes of symptomatic help with resolution of symptoms.  We will also add

Carafate 1 gram q.i.d. for the next 2 weeks then on a p.r.n. basis.  We will

have him follow up in the office in few weeks.





Job ID: [id number]

DocumentID: [id number]

Dictated Date:  07/24/2019 14:19:14

Transcription Date: 07/24/2019 16:13:42

Dictated By: JACKY OLVERA MD

## 2019-07-24 NOTE — PROGRESS NOTE-PRE OPERATIVE
Pre-Operative Progress Note


H&P Reviewed


The H&P was reviewed, patient examined and no changes noted.


Date Seen by Provider:  Jul 24, 2019


Time Seen by Provider:  12:00


Date H&P Reviewed:  Jul 24, 2019


Time H&P Reviewed:  12:00


Pre-Operative Diagnosis:  dysphagia, GERD











JACKY OLVERA MD                Jul 24, 2019 13:35

## 2019-07-24 NOTE — CONSCIOUS SEDATION/ASA
Conscious Sedation Pre-Proced


Time


12:00





ASA Score


2


For ASA 3 and 4: Consider anesthesia and medical clearance. Also, for patients

with a history of failed moderate sedation consider anesthesia.

















Airway 


 


Lungs 


 


Heart 


 


 ASA score


 


 ASA 1: a normal healthy patient


 


 ASA 2:  a patient with a mild systemic disease (mid diabetes, controlled 

hypertension, obesity 


 


 ASA 3:  a patient with a severe systemic disease that limits activity  (angina,

COPD, prior Myocardial infarction)


 


 ASA 4:  a patient with an incapacitating disease that is a constant threat to 

life (CHF, renal failure)


 


 ASA 5:  a moribund patient not expected to survive 24 hrs.  (ruptured aneurysm)


 


 ASA 6:  a declared brain-dead patient whose organs are being harvested.


 


 For emergent operations, add the letter E after the classification











Mallampati Classification


Grade 2





Sedation Plan


Analgesia, Amnesia, Plan communicated to team members, Discussed options with 

patient/fam, Discussed risks with patient/fam


The patient is an appropriate candidate to undergo the planned procedure, 

sedation, and anesthesia.





The patient immediately re-assessed prior to indication.











JACKY OLVERA MD                Jul 24, 2019 13:34

## 2019-07-30 ENCOUNTER — HOSPITAL ENCOUNTER (OUTPATIENT)
Dept: HOSPITAL 75 - LAB | Age: 64
End: 2019-07-30
Attending: FAMILY MEDICINE
Payer: MEDICARE

## 2019-07-30 DIAGNOSIS — E80.7: Primary | ICD-10-CM

## 2019-07-30 LAB
ALBUMIN SERPL-MCNC: 3.9 GM/DL (ref 3.2–4.5)
ALP SERPL-CCNC: 84 U/L (ref 40–136)
ALT SERPL-CCNC: 12 U/L (ref 0–55)
BILIRUB DIRECT SERPL-MCNC: 0.4 MG/DL (ref 0–0.3)
BILIRUB SERPL-MCNC: 1 MG/DL (ref 0.1–1)
PROT SERPL-MCNC: 6.7 GM/DL (ref 6.4–8.2)

## 2019-07-30 PROCEDURE — 36415 COLL VENOUS BLD VENIPUNCTURE: CPT

## 2019-07-30 PROCEDURE — 80076 HEPATIC FUNCTION PANEL: CPT

## 2019-08-09 LAB
ALBUMIN SERPL-MCNC: 3.9 GM/DL (ref 3.2–4.5)
ALP SERPL-CCNC: 94 U/L (ref 40–136)
ALT SERPL-CCNC: 10 U/L (ref 0–55)
BASOPHILS # BLD AUTO: 0 10^3/UL (ref 0–0.1)
BASOPHILS NFR BLD AUTO: 0 % (ref 0–10)
BILIRUB SERPL-MCNC: 0.7 MG/DL (ref 0.1–1)
BUN/CREAT SERPL: 14
CALCIUM SERPL-MCNC: 9.6 MG/DL (ref 8.5–10.1)
CHLORIDE SERPL-SCNC: 108 MMOL/L (ref 98–107)
CO2 SERPL-SCNC: 19 MMOL/L (ref 21–32)
CREAT SERPL-MCNC: 1.01 MG/DL (ref 0.6–1.3)
EOSINOPHIL # BLD AUTO: 0.2 10^3/UL (ref 0–0.3)
EOSINOPHIL NFR BLD AUTO: 2 % (ref 0–10)
ERYTHROCYTE [DISTWIDTH] IN BLOOD BY AUTOMATED COUNT: 14 % (ref 10–14.5)
GFR SERPLBLD BASED ON 1.73 SQ M-ARVRAT: > 60 ML/MIN
GLUCOSE SERPL-MCNC: 107 MG/DL (ref 70–105)
HCT VFR BLD CALC: 49 % (ref 40–54)
HGB BLD-MCNC: 16.7 G/DL (ref 13.3–17.7)
LYMPHOCYTES # BLD AUTO: 1.4 X 10^3 (ref 1–4)
LYMPHOCYTES NFR BLD AUTO: 21 % (ref 12–44)
MANUAL DIFFERENTIAL PERFORMED BLD QL: NO
MCH RBC QN AUTO: 31 PG (ref 25–34)
MCHC RBC AUTO-ENTMCNC: 34 G/DL (ref 32–36)
MCV RBC AUTO: 91 FL (ref 80–99)
MONOCYTES # BLD AUTO: 0.9 X 10^3 (ref 0–1)
MONOCYTES NFR BLD AUTO: 13 % (ref 0–12)
NEUTROPHILS # BLD AUTO: 4.2 X 10^3 (ref 1.8–7.8)
NEUTROPHILS NFR BLD AUTO: 63 % (ref 42–75)
PLATELET # BLD: 204 10^3/UL (ref 130–400)
PMV BLD AUTO: 12.8 FL (ref 7.4–10.4)
POTASSIUM SERPL-SCNC: 4.7 MMOL/L (ref 3.6–5)
PROT SERPL-MCNC: 6.7 GM/DL (ref 6.4–8.2)
SODIUM SERPL-SCNC: 137 MMOL/L (ref 135–145)
WBC # BLD AUTO: 6.7 10^3/UL (ref 4.3–11)

## 2019-08-13 ENCOUNTER — HOSPITAL ENCOUNTER (OUTPATIENT)
Dept: HOSPITAL 75 - RAD | Age: 64
End: 2019-08-13
Attending: INTERNAL MEDICINE
Payer: MEDICARE

## 2019-08-13 DIAGNOSIS — K86.1: ICD-10-CM

## 2019-08-13 DIAGNOSIS — K22.8: ICD-10-CM

## 2019-08-13 DIAGNOSIS — K80.20: ICD-10-CM

## 2019-08-13 DIAGNOSIS — C16.0: Primary | ICD-10-CM

## 2019-08-13 NOTE — DIAGNOSTIC IMAGING REPORT
INDICATION: Esophageal carcinoma, initial staging. Patient has a

prior history of colorectal carcinoma in 2016.



TECHNIQUE: Serum blood glucose level at the time of injection is

95 mg/dL. Patient was administered 13.5 mCi F-18 FDG

intravenously in the left antecubital region and PET imaging was

performed from the top of the skull through the mid thighs.

Noncontrast CT was also performed for attenuation correction and

anatomic correlation.



COMPARISON: No prior PET study is available for comparison.

Comparison is made with prior CT from 11/07/2016.



FINDINGS: There is symmetric activity within the brain.

Physiologic activity within the soft tissues of the neck is

identified. No suspicious hypermetabolism within the mediastinum

or kyra is identified. No pulmonary parenchymal hypermetabolism

is identified. There is abnormal uptake identified in the distal

esophagus near the GE junction, corresponding with the patient's

recently diagnosed esophageal carcinoma. This demonstrates SUV

max of approximately 5.7. There is physiologic activity within

the abdomen and pelvis within the GI and  tracts. No

hypermetabolic lymphadenopathy is identified. Patient does have

an ostomy in the right lower quadrant with a large parastomal

hernia present. There are multiple small stones within the

gallbladder. The pancreas is diffusely calcified, likely from

prior pancreatitis. Pulmonary parenchyma shows centrilobular

emphysematous changes. There is scarring or atelectasis in

bilateral lower lobes. No pulmonary mass is seen.



IMPRESSION:

1. Hypermetabolism of distal esophagus near the GE junction

corresponding with the recently diagnosed esophageal carcinoma.

No hypermetabolic lymphadenopathy or evidence of metastatic

disease is detected.

2. Cholelithiasis.

3. Changes of chronic pancreatitis.



Dictated by: 



  Dictated on workstation # [id number]

## 2019-08-27 ENCOUNTER — HOSPITAL ENCOUNTER (OUTPATIENT)
Dept: HOSPITAL 75 - PREOP | Age: 64
End: 2019-08-27
Attending: SURGERY
Payer: MEDICARE

## 2019-08-27 VITALS — WEIGHT: 205.25 LBS | BODY MASS INDEX: 32.21 KG/M2 | HEIGHT: 67 IN

## 2019-08-27 DIAGNOSIS — C15.9: ICD-10-CM

## 2019-08-27 DIAGNOSIS — Z01.818: Primary | ICD-10-CM

## 2019-08-29 ENCOUNTER — HOSPITAL ENCOUNTER (OUTPATIENT)
Dept: HOSPITAL 75 - SDC | Age: 64
Discharge: HOME | End: 2019-08-29
Attending: SURGERY
Payer: MEDICARE

## 2019-08-29 VITALS — SYSTOLIC BLOOD PRESSURE: 136 MMHG | DIASTOLIC BLOOD PRESSURE: 75 MMHG

## 2019-08-29 VITALS — SYSTOLIC BLOOD PRESSURE: 131 MMHG | DIASTOLIC BLOOD PRESSURE: 71 MMHG

## 2019-08-29 VITALS — WEIGHT: 205.25 LBS | BODY MASS INDEX: 32.21 KG/M2 | HEIGHT: 67 IN

## 2019-08-29 VITALS — SYSTOLIC BLOOD PRESSURE: 132 MMHG | DIASTOLIC BLOOD PRESSURE: 66 MMHG

## 2019-08-29 VITALS — SYSTOLIC BLOOD PRESSURE: 102 MMHG | DIASTOLIC BLOOD PRESSURE: 76 MMHG

## 2019-08-29 VITALS — DIASTOLIC BLOOD PRESSURE: 83 MMHG | SYSTOLIC BLOOD PRESSURE: 131 MMHG

## 2019-08-29 VITALS — DIASTOLIC BLOOD PRESSURE: 76 MMHG | SYSTOLIC BLOOD PRESSURE: 114 MMHG

## 2019-08-29 VITALS — DIASTOLIC BLOOD PRESSURE: 71 MMHG | SYSTOLIC BLOOD PRESSURE: 131 MMHG

## 2019-08-29 DIAGNOSIS — Z87.891: ICD-10-CM

## 2019-08-29 DIAGNOSIS — Z85.048: ICD-10-CM

## 2019-08-29 DIAGNOSIS — K44.9: ICD-10-CM

## 2019-08-29 DIAGNOSIS — Z85.038: ICD-10-CM

## 2019-08-29 DIAGNOSIS — C15.9: Primary | ICD-10-CM

## 2019-08-29 DIAGNOSIS — Z88.0: ICD-10-CM

## 2019-08-29 DIAGNOSIS — Z79.891: ICD-10-CM

## 2019-08-29 DIAGNOSIS — Z79.82: ICD-10-CM

## 2019-08-29 DIAGNOSIS — H54.7: ICD-10-CM

## 2019-08-29 DIAGNOSIS — K22.2: ICD-10-CM

## 2019-08-29 DIAGNOSIS — Z79.899: ICD-10-CM

## 2019-08-29 DIAGNOSIS — K21.0: ICD-10-CM

## 2019-08-29 DIAGNOSIS — Z90.49: ICD-10-CM

## 2019-08-29 PROCEDURE — 87081 CULTURE SCREEN ONLY: CPT

## 2019-08-29 PROCEDURE — 71045 X-RAY EXAM CHEST 1 VIEW: CPT

## 2019-08-29 NOTE — DISCHARGE INST-SURGICAL
D/C Lap Instructions-KIDO


Reconcile Patient Problems


Problems Reviewed?:  Yes


New, Converted, or Re-Newed RX:  RX on Chart


Follow Up Appt as needed.





Activity as tolerated


No driving for 24 hours


No driving while on pain medications





Regular Diet





Symptoms to Report: Fever over 101 degree F, Nausea/Vomiting 


Infection Signs and Symptoms to report:  Increased redness, Foul odor of wound, 

Increased drainage





Bathing instructions: May shower


Operative Area Clean/Dry;  Keep incision clean/dry





If any problems/questions: Contact your physician or go to Emergency Room











VALENTE FABIAN          Aug 29, 2019 09:03

## 2019-08-29 NOTE — DIAGNOSTIC IMAGING REPORT
INDICATION: Left subclavian central venous catheter.



COMPARISON: April 11, 2015.



TECHNIQUE: Single radiograph of the chest dated August 29, 2019.



FINDINGS: Left-sided subclavian central venous catheter is

present with the distal tip near the cavoatrial junction. No

pneumothorax. Previously noted right-sided Port-A-Cath is longer

visualized. The cardiac silhouette is within normal limits in

size. Slight prominence of the pulmonary vasculature. Low lung

volumes with slight prominence of the pulmonary interstitium

diffusely. No significant pleural effusion. No acute osseous

abnormality.



IMPRESSION: Left subclavian central venous catheter with the

distal tip near the cavoatrial junction without pneumothorax.



Prominence of the pulmonary vasculature and pulmonary

interstitium, favored to predominantly relate to low lung

volumes.



Dictated by: 



  Dictated on workstation # [id number]

## 2019-08-29 NOTE — OPERATIVE REPORT
DATE OF SERVICE:  08/29/2019



ATTENDING PRIMARY CARE PHYSICIAN:

Dr. Iniguez.



PREOPERATIVE DIAGNOSIS:

Esophageal cancer.



POSTOPERATIVE DIAGNOSIS:

Esophageal cancer.



PROCEDURE:

Placement of left subclavian Groshong implantable catheter under fluoroscopy.



SURGEON:

Jacky Olvera MD



ANESTHESIA:

Monitored anesthesia care with local.



ESTIMATED BLOOD LOSS:

Minimal.



FINDINGS:

Catheter tip at superior vena cava -- right atrial junction.



DISPOSITION:

The patient tolerated the procedure well.



INDICATIONS:

The patient is a 64-year-old male with 3-month history of dysphagia with

substernal chest pressure sensation, choking and regurgitation.  He does have

risk factors including gastroesophageal reflux disease, smoking as well as a

history of caffeine and alcohol in the past.  On 07/24/2019, he underwent an EGD

biopsy as well as a balloon dilatation.  He was found to have a significant

reflux esophagitis, stricture as well as a moderate size hiatal hernia, 4 cm in

size.  Biopsies of the GE junction were positive for invasive moderately

differentiated adenocarcinoma.



DESCRIPTION OF PROCEDURE:

The patient was brought to the endoscopy suite, laid in left lateral decubitus

position.  After adequate IV pain and sedative medications and conscious

sedation monitored anesthesia care, the chest and neck were prepped and draped

in standard surgical fashion.  The left subclavian region was anesthetized using

0.5% Marcaine with epinephrine and the left subclavian vein was cannulated with

drawing of venous blood.  The guidewire was then inserted under fluoroscopy. 

The cannulating needle removed and a skin incision made using a 15 blade.  A

tract was then created using a venous dilator and sheath under fluoroscopy.  The

guidewire and dilator were then removed and a Groshong implantable catheter was

placed until the tip was at the superior vena cava -- right atrial junction. 

The inner wire within the catheter removed and the catheter cut down to size and

the port placed onto the catheter.



We then proceeded with creation of the anterior chest subcutaneous reservoir.  A

skin incision was extended laterally using a 15 blade.  A plane was then created

between the subcutaneous fat and the anterior pectoralis fascia using blunt

dissection as well as electrocautery with visualization of good hemostasis.  The

port was then placed into the chest reservoir and sutured to the anterior

pectoralis fascia using interrupted 3-0 Vicryl sutures.  The subcutaneous tissue

was then reapproximated using 3-0 Vicryl interrupted sutures and the skin was

closed using 4-0 Monocryl running subcuticular sutures.  The skin was then

closed using 4-0 Monocryl running subcuticular suture and the skin was then

cleaned and covered with Dermabond.



The patient tolerated the procedure well.  We will get a post-procedure chest

x-ray once confirmation of placement of the catheter may be accessed and used

any time.





Job ID: [id number]

DocumentID: [id number]

Dictated Date:  08/29/2019 11:29:19

Transcription Date: 08/29/2019 16:58:23

Dictated By: JACKY OLVERA MD

## 2019-08-29 NOTE — DIAGNOSTIC IMAGING REPORT
INDICATION: Port placement.



EXAMINATION: Fluoroscopic assistance was provided for Dr. Dodge

during his port placement procedure. 

45 seconds of fluoroscopy time was utilized. A single spot film

of the thorax was obtained. 



FINDINGS: There is a central venous catheter in place on the left

with the tip of the catheter overlying the midportion of the

superior vena cava. 



IMPRESSION: Fluoroscopic assistance was provided for Dr. Dodge.  



Dictated by: 



  Dictated on workstation # [id number]

## 2019-08-29 NOTE — PROGRESS NOTE-POST OPERATIVE
Post-Operative Progess Note


Surgeon (s)/Assistant (s)


Surgeon


JACKY OLVERA MD


Assistant:  gregory shook APRN





Pre-Operative Diagnosis


Esophageal Cancer





Post-Operative Diagnosis





same





Procedure & Operative Findings


Date of Procedure


8/29/19


Procedure Performed/Findings


left subclavian groshong catheter placement


Anesthesia Type


mac





Estimated Blood Loss


Estimated blood loss (mL):  minimal





Specimens/Packing


Specimens Removed


none











JACKY OLVERA MD                Aug 29, 2019 11:15

## 2019-08-29 NOTE — PROGRESS NOTE-PRE OPERATIVE
Pre-Operative Progress Note


H&P Reviewed


The H&P was reviewed, patient examined and no changes noted.


Date Seen by Provider:  Aug 29, 2019


Time Seen by Provider:  08:55


Date H&P Reviewed:  Aug 29, 2019


Time H&P Reviewed:  08:50


Pre-Operative Diagnosis:  Esophageal Cancer











VALENTE FABIAN APRN          Aug 29, 2019 08:58

## 2019-08-29 NOTE — ANESTHESIA-GENERAL POST-OP
MAC


Patient Condition


Mental Status/LOC:  Same as Preop


Cardiovascular:  Satisfactory


Nausea/Vomiting:  Absent


Respiratory:  Satisfactory


Pain:  Controlled


Complications:  Absent





Post Op Complications


Complications


None





Follow Up Care/Instructions


Patient Instructions


None needed.





Anesthesiology Discharge Order


Discharge Order


Patient is doing well, no complaints, stable vital signs, no apparent adverse 

anesthesia problems.   


No complications reported per nursing.











JAYNA COLLINS CRNA            Aug 29, 2019 12:19

## 2019-09-09 LAB
ALBUMIN SERPL-MCNC: 3.8 GM/DL (ref 3.2–4.5)
ALP SERPL-CCNC: 86 U/L (ref 40–136)
ALT SERPL-CCNC: 12 U/L (ref 0–55)
BASOPHILS # BLD AUTO: 0 10^3/UL (ref 0–0.1)
BASOPHILS NFR BLD AUTO: 1 % (ref 0–10)
BILIRUB SERPL-MCNC: 0.6 MG/DL (ref 0.1–1)
BUN/CREAT SERPL: 16
CALCIUM SERPL-MCNC: 9.1 MG/DL (ref 8.5–10.1)
CHLORIDE SERPL-SCNC: 110 MMOL/L (ref 98–107)
CO2 SERPL-SCNC: 19 MMOL/L (ref 21–32)
CREAT SERPL-MCNC: 1.04 MG/DL (ref 0.6–1.3)
EOSINOPHIL # BLD AUTO: 0.2 10^3/UL (ref 0–0.3)
EOSINOPHIL NFR BLD AUTO: 2 % (ref 0–10)
ERYTHROCYTE [DISTWIDTH] IN BLOOD BY AUTOMATED COUNT: 14.5 % (ref 10–14.5)
GFR SERPLBLD BASED ON 1.73 SQ M-ARVRAT: > 60 ML/MIN
GLUCOSE SERPL-MCNC: 96 MG/DL (ref 70–105)
HCT VFR BLD CALC: 45 % (ref 40–54)
HGB BLD-MCNC: 15.5 G/DL (ref 13.3–17.7)
LYMPHOCYTES # BLD AUTO: 1.4 X 10^3 (ref 1–4)
LYMPHOCYTES NFR BLD AUTO: 19 % (ref 12–44)
MAGNESIUM SERPL-MCNC: 2 MG/DL (ref 1.6–2.4)
MANUAL DIFFERENTIAL PERFORMED BLD QL: NO
MCH RBC QN AUTO: 31 PG (ref 25–34)
MCHC RBC AUTO-ENTMCNC: 34 G/DL (ref 32–36)
MCV RBC AUTO: 92 FL (ref 80–99)
MONOCYTES # BLD AUTO: 0.9 X 10^3 (ref 0–1)
MONOCYTES NFR BLD AUTO: 13 % (ref 0–12)
NEUTROPHILS # BLD AUTO: 4.8 X 10^3 (ref 1.8–7.8)
NEUTROPHILS NFR BLD AUTO: 66 % (ref 42–75)
PLATELET # BLD: 191 10^3/UL (ref 130–400)
PMV BLD AUTO: 12.7 FL (ref 7.4–10.4)
POTASSIUM SERPL-SCNC: 4.5 MMOL/L (ref 3.6–5)
PROT SERPL-MCNC: 6.5 GM/DL (ref 6.4–8.2)
SODIUM SERPL-SCNC: 139 MMOL/L (ref 135–145)
WBC # BLD AUTO: 7.2 10^3/UL (ref 4.3–11)

## 2019-09-16 LAB
BASOPHILS # BLD AUTO: 0 10^3/UL (ref 0–0.1)
BASOPHILS NFR BLD AUTO: 0 % (ref 0–10)
BUN/CREAT SERPL: 18
CALCIUM SERPL-MCNC: 9.3 MG/DL (ref 8.5–10.1)
CHLORIDE SERPL-SCNC: 108 MMOL/L (ref 98–107)
CO2 SERPL-SCNC: 18 MMOL/L (ref 21–32)
CREAT SERPL-MCNC: 1.16 MG/DL (ref 0.6–1.3)
EOSINOPHIL # BLD AUTO: 0.2 10^3/UL (ref 0–0.3)
EOSINOPHIL NFR BLD AUTO: 3 % (ref 0–10)
ERYTHROCYTE [DISTWIDTH] IN BLOOD BY AUTOMATED COUNT: 14.6 % (ref 10–14.5)
GFR SERPLBLD BASED ON 1.73 SQ M-ARVRAT: > 60 ML/MIN
GLUCOSE SERPL-MCNC: 195 MG/DL (ref 70–105)
HCT VFR BLD CALC: 46 % (ref 40–54)
HGB BLD-MCNC: 15.7 G/DL (ref 13.3–17.7)
LYMPHOCYTES # BLD AUTO: 0.7 X 10^3 (ref 1–4)
LYMPHOCYTES NFR BLD AUTO: 9 % (ref 12–44)
MANUAL DIFFERENTIAL PERFORMED BLD QL: NO
MCH RBC QN AUTO: 31 PG (ref 25–34)
MCHC RBC AUTO-ENTMCNC: 34 G/DL (ref 32–36)
MCV RBC AUTO: 91 FL (ref 80–99)
MONOCYTES # BLD AUTO: 0.5 X 10^3 (ref 0–1)
MONOCYTES NFR BLD AUTO: 7 % (ref 0–12)
NEUTROPHILS # BLD AUTO: 5.5 X 10^3 (ref 1.8–7.8)
NEUTROPHILS NFR BLD AUTO: 80 % (ref 42–75)
PLATELET # BLD: 164 10^3/UL (ref 130–400)
PMV BLD AUTO: 13 FL (ref 7.4–10.4)
POTASSIUM SERPL-SCNC: 4.2 MMOL/L (ref 3.6–5)
SODIUM SERPL-SCNC: 137 MMOL/L (ref 135–145)
WBC # BLD AUTO: 6.9 10^3/UL (ref 4.3–11)

## 2019-09-23 LAB
ALBUMIN SERPL-MCNC: 3.8 GM/DL (ref 3.2–4.5)
ALP SERPL-CCNC: 74 U/L (ref 40–136)
ALT SERPL-CCNC: 18 U/L (ref 0–55)
BASOPHILS # BLD AUTO: 0 10^3/UL (ref 0–0.1)
BASOPHILS NFR BLD AUTO: 0 % (ref 0–10)
BILIRUB SERPL-MCNC: 1.1 MG/DL (ref 0.1–1)
BUN/CREAT SERPL: 14
CALCIUM SERPL-MCNC: 9.3 MG/DL (ref 8.5–10.1)
CHLORIDE SERPL-SCNC: 107 MMOL/L (ref 98–107)
CO2 SERPL-SCNC: 22 MMOL/L (ref 21–32)
CREAT SERPL-MCNC: 1.14 MG/DL (ref 0.6–1.3)
EOSINOPHIL # BLD AUTO: 0.1 10^3/UL (ref 0–0.3)
EOSINOPHIL NFR BLD AUTO: 1 % (ref 0–10)
ERYTHROCYTE [DISTWIDTH] IN BLOOD BY AUTOMATED COUNT: 15.3 % (ref 10–14.5)
GFR SERPLBLD BASED ON 1.73 SQ M-ARVRAT: > 60 ML/MIN
GLUCOSE SERPL-MCNC: 91 MG/DL (ref 70–105)
HCT VFR BLD CALC: 47 % (ref 40–54)
HGB BLD-MCNC: 16.1 G/DL (ref 13.3–17.7)
LYMPHOCYTES # BLD AUTO: 0.6 X 10^3 (ref 1–4)
LYMPHOCYTES NFR BLD AUTO: 9 % (ref 12–44)
MANUAL DIFFERENTIAL PERFORMED BLD QL: NO
MCH RBC QN AUTO: 31 PG (ref 25–34)
MCHC RBC AUTO-ENTMCNC: 35 G/DL (ref 32–36)
MCV RBC AUTO: 91 FL (ref 80–99)
MONOCYTES # BLD AUTO: 1.3 X 10^3 (ref 0–1)
MONOCYTES NFR BLD AUTO: 20 % (ref 0–12)
NEUTROPHILS # BLD AUTO: 4.6 X 10^3 (ref 1.8–7.8)
NEUTROPHILS NFR BLD AUTO: 70 % (ref 42–75)
PLATELET # BLD: 128 10^3/UL (ref 130–400)
PMV BLD AUTO: 12.8 FL (ref 7.4–10.4)
POTASSIUM SERPL-SCNC: 4.7 MMOL/L (ref 3.6–5)
PROT SERPL-MCNC: 6.6 GM/DL (ref 6.4–8.2)
SODIUM SERPL-SCNC: 138 MMOL/L (ref 135–145)
WBC # BLD AUTO: 6.6 10^3/UL (ref 4.3–11)

## 2019-09-30 LAB
BASOPHILS # BLD AUTO: 0 10^3/UL (ref 0–0.1)
BASOPHILS NFR BLD AUTO: 0 % (ref 0–10)
BUN/CREAT SERPL: 20
CALCIUM SERPL-MCNC: 9.3 MG/DL (ref 8.5–10.1)
CHLORIDE SERPL-SCNC: 108 MMOL/L (ref 98–107)
CO2 SERPL-SCNC: 17 MMOL/L (ref 21–32)
CREAT SERPL-MCNC: 1.17 MG/DL (ref 0.6–1.3)
EOSINOPHIL # BLD AUTO: 0.1 10^3/UL (ref 0–0.3)
EOSINOPHIL NFR BLD AUTO: 2 % (ref 0–10)
ERYTHROCYTE [DISTWIDTH] IN BLOOD BY AUTOMATED COUNT: 15.4 % (ref 10–14.5)
GFR SERPLBLD BASED ON 1.73 SQ M-ARVRAT: > 60 ML/MIN
GLUCOSE SERPL-MCNC: 196 MG/DL (ref 70–105)
HCT VFR BLD CALC: 47 % (ref 40–54)
HGB BLD-MCNC: 16.5 G/DL (ref 13.3–17.7)
LYMPHOCYTES # BLD AUTO: 0.3 X 10^3 (ref 1–4)
LYMPHOCYTES NFR BLD AUTO: 5 % (ref 12–44)
MANUAL DIFFERENTIAL PERFORMED BLD QL: NO
MCH RBC QN AUTO: 32 PG (ref 25–34)
MCHC RBC AUTO-ENTMCNC: 35 G/DL (ref 32–36)
MCV RBC AUTO: 90 FL (ref 80–99)
MONOCYTES # BLD AUTO: 0.6 X 10^3 (ref 0–1)
MONOCYTES NFR BLD AUTO: 12 % (ref 0–12)
NEUTROPHILS # BLD AUTO: 4.1 X 10^3 (ref 1.8–7.8)
NEUTROPHILS NFR BLD AUTO: 80 % (ref 42–75)
PLATELET # BLD: 110 10^3/UL (ref 130–400)
PMV BLD AUTO: 12.3 FL (ref 7.4–10.4)
POTASSIUM SERPL-SCNC: 4.2 MMOL/L (ref 3.6–5)
SODIUM SERPL-SCNC: 136 MMOL/L (ref 135–145)
WBC # BLD AUTO: 5.2 10^3/UL (ref 4.3–11)

## 2019-10-07 LAB
ALBUMIN SERPL-MCNC: 3.5 GM/DL (ref 3.2–4.5)
ALP SERPL-CCNC: 101 U/L (ref 40–136)
ALT SERPL-CCNC: 26 U/L (ref 0–55)
BASOPHILS # BLD AUTO: 0 10^3/UL (ref 0–0.1)
BASOPHILS NFR BLD AUTO: 1 % (ref 0–10)
BILIRUB SERPL-MCNC: 0.9 MG/DL (ref 0.1–1)
BUN/CREAT SERPL: 11
CALCIUM SERPL-MCNC: 9.1 MG/DL (ref 8.5–10.1)
CHLORIDE SERPL-SCNC: 106 MMOL/L (ref 98–107)
CO2 SERPL-SCNC: 24 MMOL/L (ref 21–32)
CREAT SERPL-MCNC: 1.09 MG/DL (ref 0.6–1.3)
EOSINOPHIL # BLD AUTO: 0.1 10^3/UL (ref 0–0.3)
EOSINOPHIL NFR BLD AUTO: 2 % (ref 0–10)
ERYTHROCYTE [DISTWIDTH] IN BLOOD BY AUTOMATED COUNT: 16.7 % (ref 10–14.5)
GFR SERPLBLD BASED ON 1.73 SQ M-ARVRAT: > 60 ML/MIN
GLUCOSE SERPL-MCNC: 144 MG/DL (ref 70–105)
HCT VFR BLD CALC: 45 % (ref 40–54)
HGB BLD-MCNC: 15.7 G/DL (ref 13.3–17.7)
LYMPHOCYTES # BLD AUTO: 0.4 X 10^3 (ref 1–4)
LYMPHOCYTES NFR BLD AUTO: 10 % (ref 12–44)
MAGNESIUM SERPL-MCNC: 1.9 MG/DL (ref 1.6–2.4)
MANUAL DIFFERENTIAL PERFORMED BLD QL: NO
MCH RBC QN AUTO: 32 PG (ref 25–34)
MCHC RBC AUTO-ENTMCNC: 35 G/DL (ref 32–36)
MCV RBC AUTO: 92 FL (ref 80–99)
MONOCYTES # BLD AUTO: 0.7 X 10^3 (ref 0–1)
MONOCYTES NFR BLD AUTO: 16 % (ref 0–12)
NEUTROPHILS # BLD AUTO: 3.2 X 10^3 (ref 1.8–7.8)
NEUTROPHILS NFR BLD AUTO: 72 % (ref 42–75)
PLATELET # BLD: 76 10^3/UL (ref 130–400)
PMV BLD AUTO: 12.6 FL (ref 7.4–10.4)
POTASSIUM SERPL-SCNC: 4.3 MMOL/L (ref 3.6–5)
PROT SERPL-MCNC: 6.4 GM/DL (ref 6.4–8.2)
SODIUM SERPL-SCNC: 137 MMOL/L (ref 135–145)
WBC # BLD AUTO: 4.4 10^3/UL (ref 4.3–11)

## 2019-10-15 LAB
BASOPHILS # BLD AUTO: 0 10^3/UL (ref 0–0.1)
BASOPHILS NFR BLD AUTO: 0 % (ref 0–10)
BUN/CREAT SERPL: 14
CALCIUM SERPL-MCNC: 9.1 MG/DL (ref 8.5–10.1)
CHLORIDE SERPL-SCNC: 107 MMOL/L (ref 98–107)
CO2 SERPL-SCNC: 21 MMOL/L (ref 21–32)
CREAT SERPL-MCNC: 1.19 MG/DL (ref 0.6–1.3)
EOSINOPHIL # BLD AUTO: 0.1 10^3/UL (ref 0–0.3)
EOSINOPHIL NFR BLD AUTO: 1 % (ref 0–10)
ERYTHROCYTE [DISTWIDTH] IN BLOOD BY AUTOMATED COUNT: 18 % (ref 10–14.5)
GFR SERPLBLD BASED ON 1.73 SQ M-ARVRAT: > 60 ML/MIN
GLUCOSE SERPL-MCNC: 136 MG/DL (ref 70–105)
HCT VFR BLD CALC: 44 % (ref 40–54)
HGB BLD-MCNC: 15.1 G/DL (ref 13.3–17.7)
LYMPHOCYTES # BLD AUTO: 0.5 X 10^3 (ref 1–4)
LYMPHOCYTES NFR BLD AUTO: 14 % (ref 12–44)
MANUAL DIFFERENTIAL PERFORMED BLD QL: NO
MCH RBC QN AUTO: 32 PG (ref 25–34)
MCHC RBC AUTO-ENTMCNC: 34 G/DL (ref 32–36)
MCV RBC AUTO: 93 FL (ref 80–99)
MONOCYTES # BLD AUTO: 0.4 X 10^3 (ref 0–1)
MONOCYTES NFR BLD AUTO: 10 % (ref 0–12)
NEUTROPHILS # BLD AUTO: 2.7 X 10^3 (ref 1.8–7.8)
NEUTROPHILS NFR BLD AUTO: 74 % (ref 42–75)
PLATELET # BLD: 93 10^3/UL (ref 130–400)
PMV BLD AUTO: 11.6 FL (ref 7.4–10.4)
POTASSIUM SERPL-SCNC: 4.7 MMOL/L (ref 3.6–5)
SODIUM SERPL-SCNC: 136 MMOL/L (ref 135–145)
WBC # BLD AUTO: 3.7 10^3/UL (ref 4.3–11)

## 2019-10-21 ENCOUNTER — HOSPITAL ENCOUNTER (OUTPATIENT)
Dept: HOSPITAL 75 - ONC | Age: 64
LOS: 17 days | Discharge: HOME | End: 2019-11-07
Attending: INTERNAL MEDICINE
Payer: MEDICARE

## 2019-10-21 VITALS — WEIGHT: 199 LBS | BODY MASS INDEX: 27.86 KG/M2 | HEIGHT: 71 IN

## 2019-10-21 DIAGNOSIS — K86.1: ICD-10-CM

## 2019-10-21 DIAGNOSIS — K80.20: ICD-10-CM

## 2019-10-21 DIAGNOSIS — C16.0: Primary | ICD-10-CM

## 2019-10-21 DIAGNOSIS — K22.8: ICD-10-CM

## 2019-10-21 LAB
ALBUMIN SERPL-MCNC: 3.5 GM/DL (ref 3.2–4.5)
ALP SERPL-CCNC: 132 U/L (ref 40–136)
ALT SERPL-CCNC: 18 U/L (ref 0–55)
BASOPHILS # BLD AUTO: 0 10^3/UL (ref 0–0.1)
BASOPHILS NFR BLD AUTO: 0 % (ref 0–10)
BILIRUB SERPL-MCNC: 1 MG/DL (ref 0.1–1)
BUN/CREAT SERPL: 11
CALCIUM SERPL-MCNC: 9 MG/DL (ref 8.5–10.1)
CHLORIDE SERPL-SCNC: 105 MMOL/L (ref 98–107)
CO2 SERPL-SCNC: 23 MMOL/L (ref 21–32)
CREAT SERPL-MCNC: 1.22 MG/DL (ref 0.6–1.3)
EOSINOPHIL # BLD AUTO: 0.1 10^3/UL (ref 0–0.3)
EOSINOPHIL NFR BLD AUTO: 1 % (ref 0–10)
ERYTHROCYTE [DISTWIDTH] IN BLOOD BY AUTOMATED COUNT: 18.6 % (ref 10–14.5)
GFR SERPLBLD BASED ON 1.73 SQ M-ARVRAT: 60 ML/MIN
GLUCOSE SERPL-MCNC: 120 MG/DL (ref 70–105)
HCT VFR BLD CALC: 43 % (ref 40–54)
HGB BLD-MCNC: 15.3 G/DL (ref 13.3–17.7)
LYMPHOCYTES # BLD AUTO: 0.2 X 10^3 (ref 1–4)
LYMPHOCYTES NFR BLD AUTO: 6 % (ref 12–44)
MAGNESIUM SERPL-MCNC: 2 MG/DL (ref 1.6–2.4)
MANUAL DIFFERENTIAL PERFORMED BLD QL: NO
MCH RBC QN AUTO: 33 PG (ref 25–34)
MCHC RBC AUTO-ENTMCNC: 35 G/DL (ref 32–36)
MCV RBC AUTO: 92 FL (ref 80–99)
MONOCYTES # BLD AUTO: 1.3 X 10^3 (ref 0–1)
MONOCYTES NFR BLD AUTO: 38 % (ref 0–12)
NEUTROPHILS # BLD AUTO: 1.9 X 10^3 (ref 1.8–7.8)
NEUTROPHILS NFR BLD AUTO: 55 % (ref 42–75)
PLATELET # BLD: 107 10^3/UL (ref 130–400)
PMV BLD AUTO: 12.7 FL (ref 7.4–10.4)
POTASSIUM SERPL-SCNC: 4.4 MMOL/L (ref 3.6–5)
PROT SERPL-MCNC: 6.6 GM/DL (ref 6.4–8.2)
SODIUM SERPL-SCNC: 136 MMOL/L (ref 135–145)
WBC # BLD AUTO: 3.5 10^3/UL (ref 4.3–11)

## 2019-10-21 PROCEDURE — 77334 RADIATION TREATMENT AID(S): CPT

## 2019-10-21 PROCEDURE — 77300 RADIATION THERAPY DOSE PLAN: CPT

## 2019-10-21 PROCEDURE — 36591 DRAW BLOOD OFF VENOUS DEVICE: CPT

## 2019-10-21 PROCEDURE — 77417 THER RADIOLOGY PORT IMAGE(S): CPT

## 2019-10-21 PROCEDURE — 85025 COMPLETE CBC W/AUTO DIFF WBC: CPT

## 2019-10-21 PROCEDURE — 96375 TX/PRO/DX INJ NEW DRUG ADDON: CPT

## 2019-10-21 PROCEDURE — 80053 COMPREHEN METABOLIC PANEL: CPT

## 2019-10-21 PROCEDURE — 82378 CARCINOEMBRYONIC ANTIGEN: CPT

## 2019-10-21 PROCEDURE — 77338 DESIGN MLC DEVICE FOR IMRT: CPT

## 2019-10-21 PROCEDURE — 96416 CHEMO PROLONG INFUSE W/PUMP: CPT

## 2019-10-21 PROCEDURE — 77336 RADIATION PHYSICS CONSULT: CPT

## 2019-10-21 PROCEDURE — 77470 SPECIAL RADIATION TREATMENT: CPT

## 2019-10-21 PROCEDURE — 80048 BASIC METABOLIC PNL TOTAL CA: CPT

## 2019-10-21 PROCEDURE — 96367 TX/PROPH/DG ADDL SEQ IV INF: CPT

## 2019-10-21 PROCEDURE — 99214 OFFICE O/P EST MOD 30 MIN: CPT

## 2019-10-21 PROCEDURE — 96368 THER/DIAG CONCURRENT INF: CPT

## 2019-10-21 PROCEDURE — 96411 CHEMO IV PUSH ADDL DRUG: CPT

## 2019-10-21 PROCEDURE — 77386: CPT

## 2019-10-21 PROCEDURE — 83735 ASSAY OF MAGNESIUM: CPT

## 2019-10-21 PROCEDURE — 36415 COLL VENOUS BLD VENIPUNCTURE: CPT

## 2019-10-21 PROCEDURE — 99213 OFFICE O/P EST LOW 20 MIN: CPT

## 2019-10-21 PROCEDURE — 77301 RADIOTHERAPY DOSE PLAN IMRT: CPT

## 2019-10-21 PROCEDURE — 96413 CHEMO IV INFUSION 1 HR: CPT

## 2019-10-21 PROCEDURE — 77307 TELETHX ISODOSE PLAN CPLX: CPT

## 2020-11-16 ENCOUNTER — HOSPITAL ENCOUNTER (OUTPATIENT)
Dept: HOSPITAL 75 - LAB | Age: 65
End: 2020-11-16
Attending: NURSE PRACTITIONER
Payer: MEDICARE

## 2020-11-16 DIAGNOSIS — Z85.038: ICD-10-CM

## 2020-11-16 DIAGNOSIS — Z00.00: ICD-10-CM

## 2020-11-16 DIAGNOSIS — Z13.220: Primary | ICD-10-CM

## 2020-11-16 LAB
ALBUMIN SERPL-MCNC: 3.9 GM/DL (ref 3.2–4.5)
ALP SERPL-CCNC: 109 U/L (ref 40–136)
ALT SERPL-CCNC: 26 U/L (ref 0–55)
BASOPHILS # BLD AUTO: 0.1 10^3/UL (ref 0–0.1)
BASOPHILS NFR BLD AUTO: 1 % (ref 0–10)
BILIRUB SERPL-MCNC: 1.6 MG/DL (ref 0.1–1)
BUN/CREAT SERPL: 13
CALCIUM SERPL-MCNC: 9.7 MG/DL (ref 8.5–10.1)
CHLORIDE SERPL-SCNC: 103 MMOL/L (ref 98–107)
CHOLEST SERPL-MCNC: 146 MG/DL (ref ?–200)
CO2 SERPL-SCNC: 20 MMOL/L (ref 21–32)
CREAT SERPL-MCNC: 1.17 MG/DL (ref 0.6–1.3)
EOSINOPHIL # BLD AUTO: 0.1 10^3/UL (ref 0–0.3)
EOSINOPHIL NFR BLD AUTO: 1 % (ref 0–10)
GFR SERPLBLD BASED ON 1.73 SQ M-ARVRAT: > 60 ML/MIN
GLUCOSE SERPL-MCNC: 101 MG/DL (ref 70–105)
HCT VFR BLD CALC: 53 % (ref 40–54)
HDLC SERPL-MCNC: 39 MG/DL (ref 40–60)
HGB BLD-MCNC: 17.7 G/DL (ref 13.3–17.7)
LYMPHOCYTES # BLD AUTO: 1.4 10^3/UL (ref 1–4)
LYMPHOCYTES NFR BLD AUTO: 17 % (ref 12–44)
MANUAL DIFFERENTIAL PERFORMED BLD QL: NO
MCH RBC QN AUTO: 32 PG (ref 25–34)
MCHC RBC AUTO-ENTMCNC: 34 G/DL (ref 32–36)
MCV RBC AUTO: 96 FL (ref 80–99)
MONOCYTES # BLD AUTO: 1.1 10^3/UL (ref 0–1)
MONOCYTES NFR BLD AUTO: 13 % (ref 0–12)
NEUTROPHILS # BLD AUTO: 5.6 10^3/UL (ref 1.8–7.8)
NEUTROPHILS NFR BLD AUTO: 68 % (ref 42–75)
PLATELET # BLD: 208 10^3/UL (ref 130–400)
PMV BLD AUTO: 11.8 FL (ref 9–12.2)
POTASSIUM SERPL-SCNC: 5.2 MMOL/L (ref 3.6–5)
PROT SERPL-MCNC: 7.2 GM/DL (ref 6.4–8.2)
SODIUM SERPL-SCNC: 135 MMOL/L (ref 135–145)
TRIGL SERPL-MCNC: 246 MG/DL (ref ?–150)
VLDLC SERPL CALC-MCNC: 49 MG/DL (ref 5–40)
WBC # BLD AUTO: 8.2 10^3/UL (ref 4.3–11)

## 2020-11-16 PROCEDURE — 80053 COMPREHEN METABOLIC PANEL: CPT

## 2020-11-16 PROCEDURE — 80061 LIPID PANEL: CPT

## 2020-11-16 PROCEDURE — 84443 ASSAY THYROID STIM HORMONE: CPT

## 2020-11-16 PROCEDURE — 84153 ASSAY OF PSA TOTAL: CPT

## 2020-11-16 PROCEDURE — 85025 COMPLETE CBC W/AUTO DIFF WBC: CPT

## 2020-11-16 PROCEDURE — 36415 COLL VENOUS BLD VENIPUNCTURE: CPT
